# Patient Record
Sex: FEMALE | Race: BLACK OR AFRICAN AMERICAN | HISPANIC OR LATINO | Employment: FULL TIME | URBAN - METROPOLITAN AREA
[De-identification: names, ages, dates, MRNs, and addresses within clinical notes are randomized per-mention and may not be internally consistent; named-entity substitution may affect disease eponyms.]

---

## 2023-07-26 ENCOUNTER — LAB VISIT (OUTPATIENT)
Dept: LAB | Facility: HOSPITAL | Age: 38
End: 2023-07-26
Payer: MEDICAID

## 2023-07-26 ENCOUNTER — OFFICE VISIT (OUTPATIENT)
Dept: OBSTETRICS AND GYNECOLOGY | Facility: CLINIC | Age: 38
End: 2023-07-26
Payer: MEDICAID

## 2023-07-26 VITALS
WEIGHT: 224.63 LBS | HEIGHT: 64 IN | BODY MASS INDEX: 38.35 KG/M2 | SYSTOLIC BLOOD PRESSURE: 130 MMHG | DIASTOLIC BLOOD PRESSURE: 74 MMHG

## 2023-07-26 DIAGNOSIS — N91.2 AMENORRHEA: ICD-10-CM

## 2023-07-26 DIAGNOSIS — O26.842 UTERINE SIZE-DATE DISCREPANCY IN SECOND TRIMESTER: ICD-10-CM

## 2023-07-26 DIAGNOSIS — Z32.00 POSSIBLE PREGNANCY: ICD-10-CM

## 2023-07-26 DIAGNOSIS — Z32.00 POSSIBLE PREGNANCY: Primary | ICD-10-CM

## 2023-07-26 LAB
ABO + RH BLD: NORMAL
ANION GAP SERPL CALC-SCNC: 7 MMOL/L (ref 8–16)
B-HCG UR QL: POSITIVE
BASOPHILS # BLD AUTO: 0.02 K/UL (ref 0–0.2)
BASOPHILS NFR BLD: 0.4 % (ref 0–1.9)
BLD GP AB SCN CELLS X3 SERPL QL: NORMAL
BUN SERPL-MCNC: 8 MG/DL (ref 6–20)
CALCIUM SERPL-MCNC: 8.2 MG/DL (ref 8.7–10.5)
CHLORIDE SERPL-SCNC: 105 MMOL/L (ref 95–110)
CO2 SERPL-SCNC: 24 MMOL/L (ref 23–29)
CREAT SERPL-MCNC: 0.7 MG/DL (ref 0.5–1.4)
CTP QC/QA: YES
DIFFERENTIAL METHOD: ABNORMAL
EOSINOPHIL # BLD AUTO: 0.1 K/UL (ref 0–0.5)
EOSINOPHIL NFR BLD: 1 % (ref 0–8)
ERYTHROCYTE [DISTWIDTH] IN BLOOD BY AUTOMATED COUNT: 13.8 % (ref 11.5–14.5)
EST. GFR  (NO RACE VARIABLE): >60 ML/MIN/1.73 M^2
ESTIMATED AVG GLUCOSE: 103 MG/DL (ref 68–131)
GLUCOSE SERPL-MCNC: 87 MG/DL (ref 70–110)
HBA1C MFR BLD: 5.2 % (ref 4–5.6)
HBV SURFACE AG SERPL QL IA: NORMAL
HCT VFR BLD AUTO: 33.9 % (ref 37–48.5)
HCV AB SERPL QL IA: NORMAL
HGB BLD-MCNC: 10.7 G/DL (ref 12–16)
HIV 1+2 AB+HIV1 P24 AG SERPL QL IA: NORMAL
IMM GRANULOCYTES # BLD AUTO: 0.01 K/UL (ref 0–0.04)
IMM GRANULOCYTES NFR BLD AUTO: 0.2 % (ref 0–0.5)
LYMPHOCYTES # BLD AUTO: 1.2 K/UL (ref 1–4.8)
LYMPHOCYTES NFR BLD: 23.9 % (ref 18–48)
MCH RBC QN AUTO: 29.4 PG (ref 27–31)
MCHC RBC AUTO-ENTMCNC: 31.6 G/DL (ref 32–36)
MCV RBC AUTO: 93 FL (ref 82–98)
MONOCYTES # BLD AUTO: 0.5 K/UL (ref 0.3–1)
MONOCYTES NFR BLD: 9.6 % (ref 4–15)
NEUTROPHILS # BLD AUTO: 3.2 K/UL (ref 1.8–7.7)
NEUTROPHILS NFR BLD: 64.9 % (ref 38–73)
NRBC BLD-RTO: 0 /100 WBC
PLATELET # BLD AUTO: 412 K/UL (ref 150–450)
PMV BLD AUTO: 8.9 FL (ref 9.2–12.9)
POTASSIUM SERPL-SCNC: 3.8 MMOL/L (ref 3.5–5.1)
RBC # BLD AUTO: 3.64 M/UL (ref 4–5.4)
SODIUM SERPL-SCNC: 136 MMOL/L (ref 136–145)
SPECIMEN OUTDATE: NORMAL
WBC # BLD AUTO: 4.98 K/UL (ref 3.9–12.7)

## 2023-07-26 PROCEDURE — 1159F PR MEDICATION LIST DOCUMENTED IN MEDICAL RECORD: ICD-10-PCS | Mod: CPTII,,,

## 2023-07-26 PROCEDURE — 83020 HEMOGLOBIN ELECTROPHORESIS: CPT

## 2023-07-26 PROCEDURE — 3078F PR MOST RECENT DIASTOLIC BLOOD PRESSURE < 80 MM HG: ICD-10-PCS | Mod: CPTII,,,

## 2023-07-26 PROCEDURE — 86803 HEPATITIS C AB TEST: CPT

## 2023-07-26 PROCEDURE — 86592 SYPHILIS TEST NON-TREP QUAL: CPT

## 2023-07-26 PROCEDURE — 3008F BODY MASS INDEX DOCD: CPT | Mod: CPTII,,,

## 2023-07-26 PROCEDURE — 81025 URINE PREGNANCY TEST: CPT | Mod: PBBFAC

## 2023-07-26 PROCEDURE — 99999PBSHW POCT URINE PREGNANCY: Mod: PBBFAC,,,

## 2023-07-26 PROCEDURE — 83036 HEMOGLOBIN GLYCOSYLATED A1C: CPT

## 2023-07-26 PROCEDURE — 99999PBSHW PR PBB SHADOW TECHNICAL ONLY FILED TO HB: ICD-10-PCS | Mod: PBBFAC,,,

## 2023-07-26 PROCEDURE — 86762 RUBELLA ANTIBODY: CPT

## 2023-07-26 PROCEDURE — 3075F PR MOST RECENT SYSTOLIC BLOOD PRESS GE 130-139MM HG: ICD-10-PCS | Mod: CPTII,,,

## 2023-07-26 PROCEDURE — 1159F MED LIST DOCD IN RCRD: CPT | Mod: CPTII,,,

## 2023-07-26 PROCEDURE — 80048 BASIC METABOLIC PNL TOTAL CA: CPT

## 2023-07-26 PROCEDURE — 87340 HEPATITIS B SURFACE AG IA: CPT

## 2023-07-26 PROCEDURE — 85025 COMPLETE CBC W/AUTO DIFF WBC: CPT

## 2023-07-26 PROCEDURE — 87389 HIV-1 AG W/HIV-1&-2 AB AG IA: CPT

## 2023-07-26 PROCEDURE — 3075F SYST BP GE 130 - 139MM HG: CPT | Mod: CPTII,,,

## 2023-07-26 PROCEDURE — 99999PBSHW PR PBB SHADOW TECHNICAL ONLY FILED TO HB: Mod: PBBFAC,,,

## 2023-07-26 PROCEDURE — 86900 BLOOD TYPING SEROLOGIC ABO: CPT

## 2023-07-26 PROCEDURE — 87591 N.GONORRHOEAE DNA AMP PROB: CPT

## 2023-07-26 PROCEDURE — 87086 URINE CULTURE/COLONY COUNT: CPT

## 2023-07-26 PROCEDURE — 99203 OFFICE O/P NEW LOW 30 MIN: CPT | Mod: S$PBB,TH,,

## 2023-07-26 PROCEDURE — 81514 NFCT DS BV&VAGINITIS DNA ALG: CPT

## 2023-07-26 PROCEDURE — 99203 OFFICE O/P NEW LOW 30 MIN: CPT | Mod: PBBFAC,TH

## 2023-07-26 PROCEDURE — 3008F PR BODY MASS INDEX (BMI) DOCUMENTED: ICD-10-PCS | Mod: CPTII,,,

## 2023-07-26 PROCEDURE — 99999 PR PBB SHADOW E&M-NEW PATIENT-LVL III: CPT | Mod: PBBFAC,,,

## 2023-07-26 PROCEDURE — 36415 COLL VENOUS BLD VENIPUNCTURE: CPT

## 2023-07-26 PROCEDURE — 3078F DIAST BP <80 MM HG: CPT | Mod: CPTII,,,

## 2023-07-26 PROCEDURE — 88175 CYTOPATH C/V AUTO FLUID REDO: CPT

## 2023-07-26 PROCEDURE — 99203 PR OFFICE/OUTPT VISIT, NEW, LEVL III, 30-44 MIN: ICD-10-PCS | Mod: S$PBB,TH,,

## 2023-07-26 PROCEDURE — 99999 PR PBB SHADOW E&M-NEW PATIENT-LVL III: ICD-10-PCS | Mod: PBBFAC,,,

## 2023-07-26 RX ORDER — LISDEXAMFETAMINE DIMESYLATE 50 MG/1
50 CAPSULE ORAL EVERY MORNING
COMMUNITY
Start: 2023-06-24 | End: 2023-11-07

## 2023-07-26 RX ORDER — FLUOXETINE HYDROCHLORIDE 40 MG/1
40 CAPSULE ORAL EVERY MORNING
COMMUNITY
Start: 2023-07-03

## 2023-07-26 RX ORDER — BUPROPION HYDROCHLORIDE 150 MG/1
150 TABLET ORAL EVERY MORNING
COMMUNITY
Start: 2023-07-03

## 2023-07-26 NOTE — PROGRESS NOTES
Brianna Aguirre is a 37 y.o. , presents today for amenorrhea.  Pregnancy is unexpected, not sure if she wants to place baby UFA. 3 kids at home youngest is 13.    C/C: amenorrhea  She  has not seen any other provider for this pregnancy. Went to a pregnancy crisis center  and told she was 19 weeks.     HPI: Reports amenorrhea since Patient's last menstrual period was 03/15/2023 (exact date). Prior to LMP, menses were regular prior to this.  She is not currently on any contraception. + UPT on 23. Also reports nausea without vomiting. Has noticed breast tenderness. Denies vaginal bleeding since LMP.    SOCIAL HISTORY: Denies emotional/mental/physical/sexual violence or abuse. Feels safe at home.      PAP HISTORY: last pap unknown, results-normal  denies any history of abnormal pap smear or STDs.     Reports no long-term chronic medical conditions    Review of patient's allergies indicates:  No Known Allergies  History reviewed. No pertinent past medical history.  History reviewed. No pertinent surgical history.  History reviewed. No pertinent surgical history.  OB History    Para Term  AB Living   4 3 3     3   SAB IAB Ectopic Multiple Live Births                  # Outcome Date GA Lbr Ed/2nd Weight Sex Delivery Anes PTL Lv   4 Current            3 Term            2 Term            1 Term              OB History          4    Para   3    Term   3            AB        Living   3         SAB        IAB        Ectopic        Multiple        Live Births                   Social History     Socioeconomic History    Marital status: Single   Tobacco Use    Smoking status: Never    Smokeless tobacco: Never   Substance and Sexual Activity    Alcohol use: Never    Drug use: Never    Sexual activity: Yes     Partners: Male     History reviewed. No pertinent family history.  Social History     Substance and Sexual Activity   Sexual Activity Yes    Partners: Male         Mary SCHUMACHER  "LINNEA Frias     ROS:    Constitutional/Gen: Denies fevers, chills, malaise, or weight loss. Positive fatigue   Psych: Denies depression, anxiety  Eyes: Denies changes in vision or scotomata  Ears, nose, mouth, throat: Denies sinus tenderness, swelling, or dentition problems  CV/vasc: Denies heart palpitations or edema  Resp: Denies SOB or dyspnea  Breasts: Denies mass, nipple discharge, or trauma. Positive breast tenderness.  GI: Denies constipation, diarrhea, or vomiting. Negative nausea.  : Denies vaginal discharge, dysuria or pelvic pain. Positive urinary frequency  MS: Denies weakness, soreness, or changes in ROM    OBJECTIVE:  /74   Ht 5' 4" (1.626 m)   Wt 101.9 kg (224 lb 10.4 oz)   LMP 03/15/2023 (Exact Date)   BMI 38.56 kg/m²   Constitutional/Gen: NAD, appears stated age, Well groomed  Neck: supple, no masses or enlargement  Head: normocephalic  Skin: warm and dry w/o rash  Lung: normal resp effort, CTAB  Heart: normal HR, RRR   Breasts: bilaterally--no masses, tenderness, skin changes, or nipple discharge noted  Abdomen: soft, nontender, no masses, and bowel sounds normal, no enlargement  External genitalia: no lesions or discharge, normal hair distribution  Urethral meatus: normal size and location, no lesions or prolapse  Vagina: normal appearance, no lesions, no discharge, no evidence cystocele or rectocele.  Cervix: normal appearance, no discharge, no lesions, negative CMT  Uterus: nontender, mobile, approx 20 week size, contour, and position. 162 FHTs via Doppler  Adnexa: no masses or tenderness  Anus/Perineum: normal appearance, with no lesions or discharge. Internal exam deferred.  Extremities: FROM, with no edema or tenderness.  Neurologic: A&O x 4, non-focal, cranial nerves 2-12 grossly intact  Psych: affect appropriate and without signs of mood, thought or memory difficulty appreciated    UPT positive in office    ASSESSMENT:  37 y.o. female  with amenorrhea  Likely at 19w via " LMP   Body mass index is 38.56 kg/m².  There is no problem list on file for this patient.      PLAN:  1. Amenorrhea  -- + UPT in office, Patient's last menstrual period was 03/15/2023 (exact date). --> Estimated Date of Delivery: 12/20/2023  -- Dating US Scheduled  -- Routine serum and urine prenatal labs today    2. Physical exam today. Discussed ASCCP guidelines. . Pap completed today/     3. BMI - unable to calculate due to advanced gestation     -- Discussed IOM recommended weight gain of:   Weight category Pre pre BMI  Recommended TWG   Underweight Less than 18.5 28-40    Normal Weight 18.5-24.9  25-35    Overweight 25-29.9  15-25    Obese   30 and greater  11-20   -- Discussed criteria for delivery at [facility] r/t excessive pre-preg weight or excessive weight gain:   Pre-pregnancy BMI over 40 or excess pregnancy weight gain defined as:   Pre-preg BMI < 18.5; Excess weight gain = > 60 pound   Pre-preg BMI 18.5-24.9;  Excess weight gain = > 53 pounds   Pre-preg BMI 25-29.9;  Excess weight gain = > 38 pounds   Pre-preg BMI > 30;  Excess weight gain = > 30 pounds    4. Discussed nausea and vomiting in pregnancy  -- Education regarding lifestyle and dietary modifications  -- Reviewed use of B6/Unisom prn. Pt will notify us if no relief/worsening symptoms, will consider alternative therapies prn    5. Pregnancy education and couseling; handouts and booklet provided  -- Oriented to practice and anticipated prenatal course of care and how to contact us  -- Precautions/warning signs reviewed  -- Common complaints of pregnancy  -- Routine prenatal labs including HIV  -- Ultrasounds  -- Childbirth education/hospital/[facility] facilities  -- Nutrition, prepregnant BMI, and recommended weight gain  -- Toxoplasmosis precautions (Cats/Raw Meat)  -- Sexual activity and exercise  -- Environmental/Work hazards  -- Travel  -- Tobacco (Ask, Advise, Assess, Assist, and Arrange), as well as alcohol and drug use  -- Use of any medications  (Including supplements, Vitamins, Herbs, or OTC Drugs)      6. Reviewed warning signs, precautions, and how/when to contact us.     7. RTC x 1 weeks, call or present sooner prn.      Updated Medication List:  Current Outpatient Medications   Medication Sig Dispense Refill    buPROPion (WELLBUTRIN XL) 150 MG TB24 tablet Take 150 mg by mouth every morning.      FLUoxetine 40 MG capsule Take 40 mg by mouth every morning.      VYVANSE 50 mg capsule Take 50 mg by mouth every morning.       No current facility-administered medications for this visit.       Possible pregnancy  -     POCT Urine Pregnancy    Amenorrhea  -     Hepatitis C Antibody; Future; Expected date: 07/26/2023  -     HIV 1/2 Ag/Ab (4th Gen); Future; Expected date: 07/26/2023  -     RPR; Future; Expected date: 07/26/2023  -     Hepatitis B surface antigen; Future; Expected date: 07/26/2023  -     Type & Screen; Future; Expected date: 07/26/2023  -     Rubella antibody, IgG; Future; Expected date: 07/26/2023  -     Urine culture  -     CBC auto differential; Future; Expected date: 07/26/2023  -     Basic metabolic panel; Future; Expected date: 07/26/2023  -     C. trachomatis/N. gonorrhoeae by AMP DNA [facility]; Vagina  -     Liquid-Based Pap Smear, Screening  -     Vaginosis Screen by DNA Probe  -     Hemoglobin Electrophoresis,Hgb A2 [facility].; Future; Expected date: 07/26/2023  -     HEMOGLOBIN A1C; Future; Expected date: 07/26/2023    Uterine size-date discrepancy in second trimester  -     US OB/GYN Procedure ([facility]); Future

## 2023-07-27 LAB
BACTERIAL VAGINOSIS DNA: POSITIVE
C TRACH DNA SPEC QL NAA+PROBE: NOT DETECTED
CANDIDA GLABRATA DNA: NEGATIVE
CANDIDA KRUSEI DNA: NEGATIVE
CANDIDA RRNA VAG QL PROBE: NEGATIVE
HGB A2 MFR BLD HPLC: 2.6 % (ref 2.2–3.2)
HGB FRACT BLD ELPH-IMP: NORMAL
HGB FRACT BLD ELPH-IMP: NORMAL
N GONORRHOEA DNA SPEC QL NAA+PROBE: NOT DETECTED
RPR SER QL: NORMAL
RUBV IGG SER-ACNC: 34.2 IU/ML
RUBV IGG SER-IMP: REACTIVE
T VAGINALIS RRNA GENITAL QL PROBE: NEGATIVE

## 2023-07-28 DIAGNOSIS — O23.599 BACTERIAL VAGINOSIS IN PREGNANCY: Primary | ICD-10-CM

## 2023-07-28 DIAGNOSIS — B96.89 BACTERIAL VAGINOSIS IN PREGNANCY: Primary | ICD-10-CM

## 2023-07-28 LAB — BACTERIA UR CULT: NO GROWTH

## 2023-07-28 RX ORDER — METRONIDAZOLE 500 MG/1
500 TABLET ORAL 2 TIMES DAILY
Qty: 14 TABLET | Refills: 0 | Status: SHIPPED | OUTPATIENT
Start: 2023-07-28 | End: 2023-08-04

## 2023-07-31 ENCOUNTER — PATIENT MESSAGE (OUTPATIENT)
Dept: RESEARCH | Facility: HOSPITAL | Age: 38
End: 2023-07-31
Payer: MEDICAID

## 2023-07-31 LAB
FINAL PATHOLOGIC DIAGNOSIS: NORMAL
Lab: NORMAL

## 2023-08-07 ENCOUNTER — PROCEDURE VISIT (OUTPATIENT)
Dept: OBSTETRICS AND GYNECOLOGY | Facility: CLINIC | Age: 38
End: 2023-08-07
Payer: MEDICAID

## 2023-08-07 ENCOUNTER — LAB VISIT (OUTPATIENT)
Dept: LAB | Facility: HOSPITAL | Age: 38
End: 2023-08-07
Attending: MIDWIFE
Payer: MEDICAID

## 2023-08-07 ENCOUNTER — INITIAL PRENATAL (OUTPATIENT)
Dept: OBSTETRICS AND GYNECOLOGY | Facility: CLINIC | Age: 38
End: 2023-08-07
Payer: MEDICAID

## 2023-08-07 VITALS — WEIGHT: 226.19 LBS | BODY MASS INDEX: 38.83 KG/M2

## 2023-08-07 DIAGNOSIS — F90.0 ATTENTION DEFICIT HYPERACTIVITY DISORDER (ADHD), PREDOMINANTLY INATTENTIVE TYPE: ICD-10-CM

## 2023-08-07 DIAGNOSIS — D64.9 MILD ANEMIA: ICD-10-CM

## 2023-08-07 DIAGNOSIS — Z36.2 ENCOUNTER FOR FOLLOW-UP ULTRASOUND OF FETAL ANATOMY: ICD-10-CM

## 2023-08-07 DIAGNOSIS — F41.9 ANXIETY AND DEPRESSION: ICD-10-CM

## 2023-08-07 DIAGNOSIS — F32.A ANXIETY AND DEPRESSION: ICD-10-CM

## 2023-08-07 DIAGNOSIS — O09.522 MULTIGRAVIDA OF ADVANCED MATERNAL AGE IN SECOND TRIMESTER: ICD-10-CM

## 2023-08-07 DIAGNOSIS — O26.842 UTERINE SIZE-DATE DISCREPANCY IN SECOND TRIMESTER: ICD-10-CM

## 2023-08-07 DIAGNOSIS — B96.89 BACTERIAL VAGINOSIS IN PREGNANCY: ICD-10-CM

## 2023-08-07 DIAGNOSIS — O09.522 MULTIGRAVIDA OF ADVANCED MATERNAL AGE IN SECOND TRIMESTER: Primary | ICD-10-CM

## 2023-08-07 DIAGNOSIS — O23.599 BACTERIAL VAGINOSIS IN PREGNANCY: ICD-10-CM

## 2023-08-07 PROBLEM — O09.529 AMA (ADVANCED MATERNAL AGE) MULTIGRAVIDA 35+: Status: ACTIVE | Noted: 2023-08-07

## 2023-08-07 PROBLEM — F90.9 ADHD: Status: ACTIVE | Noted: 2023-08-07

## 2023-08-07 PROCEDURE — 76811 US OB/GYN PROCEDURE (VIEWPOINT): ICD-10-PCS | Mod: 26,S$PBB,, | Performed by: OBSTETRICS & GYNECOLOGY

## 2023-08-07 PROCEDURE — 76811 OB US DETAILED SNGL FETUS: CPT | Mod: PBBFAC | Performed by: OBSTETRICS & GYNECOLOGY

## 2023-08-07 PROCEDURE — 99214 PR OFFICE/OUTPT VISIT, EST, LEVL IV, 30-39 MIN: ICD-10-PCS | Mod: S$PBB,TH,, | Performed by: MIDWIFE

## 2023-08-07 PROCEDURE — 99212 OFFICE O/P EST SF 10 MIN: CPT | Mod: PBBFAC,TH | Performed by: MIDWIFE

## 2023-08-07 PROCEDURE — 99214 OFFICE O/P EST MOD 30 MIN: CPT | Mod: S$PBB,TH,, | Performed by: MIDWIFE

## 2023-08-07 PROCEDURE — 99999 PR PBB SHADOW E&M-EST. PATIENT-LVL II: ICD-10-PCS | Mod: PBBFAC,,, | Performed by: MIDWIFE

## 2023-08-07 PROCEDURE — 99999 PR PBB SHADOW E&M-EST. PATIENT-LVL II: CPT | Mod: PBBFAC,,, | Performed by: MIDWIFE

## 2023-08-07 PROCEDURE — 36415 COLL VENOUS BLD VENIPUNCTURE: CPT | Performed by: MIDWIFE

## 2023-08-07 RX ORDER — FERROUS SULFATE 325(65) MG
325 TABLET, DELAYED RELEASE (ENTERIC COATED) ORAL DAILY
Qty: 60 TABLET | Refills: 5 | Status: SHIPPED | OUTPATIENT
Start: 2023-08-07 | End: 2023-09-28 | Stop reason: DRUGHIGH

## 2023-08-07 RX ORDER — NAPROXEN SODIUM 220 MG/1
81 TABLET, FILM COATED ORAL DAILY
Qty: 90 TABLET | Refills: 2 | Status: ON HOLD | OUTPATIENT
Start: 2023-08-07 | End: 2023-11-27 | Stop reason: HOSPADM

## 2023-08-08 NOTE — PROGRESS NOTES
Multigravida of advanced maternal age in second trimester  -     Prenatal Miscellaneous Test, Blood; Future; Expected date: 08/07/2023    Patient here for Initial OB visit and dating scan  U/S: single viable IUP. DMITRIY based on CRL: 12/16/2023. Posterior placenta, 3VC. Some views sub-optimal.   Denies pain or vaginal bleeding. +fetal movement. Is more accepting of pregnancy.  Reviewed Risk Assessment note and New OB labs.  Discussed genetic screening in pregnancy.[id number] today.  A to Z book/L&D card given. Encouraged to reference as needed.   Bleeding and pain precautions reviewed  RTC in 4 weeks with f/u scan, sooner if needed    Mild anemia  -     ferrous sulfate 325 (65 FE) MG EC tablet; Take 1 tablet (325 mg total) by mouth once daily.  Dispense: 60 tablet; Refill: 5    Bacterial vaginosis in pregnancy    States completed Flagyl Rx    Encounter for follow-up ultrasound of fetal anatomy  -     US OB/GYN Procedure ([facility])-Future; Future    BMI 38.0-38.9,adult  -     aspirin 81 MG Chew; Take 1 tablet (81 mg total) by mouth once daily.  Dispense: 90 tablet; Refill: 2    Discussed benefits of daily baby aspirin and Rx sent     Attention deficit hyperactivity disorder (ADHD), predominantly inattentive type         Takes Vyvanse daily. Discussed significant risk of withdrawal symptoms in infant and increased risk for NICU admission. Highly advised discontinuing. Pt states she will wean herself off. She has been in contact with prescriber regarding use in pregnancy as well.     Anxiety and depression    Stable on Prozac and Wellbutrin

## 2023-08-21 ENCOUNTER — PATIENT MESSAGE (OUTPATIENT)
Dept: OBSTETRICS AND GYNECOLOGY | Facility: CLINIC | Age: 38
End: 2023-08-21
Payer: MEDICAID

## 2023-08-26 ENCOUNTER — PATIENT MESSAGE (OUTPATIENT)
Dept: OTHER | Facility: OTHER | Age: 38
End: 2023-08-26
Payer: MEDICAID

## 2023-09-06 ENCOUNTER — ROUTINE PRENATAL (OUTPATIENT)
Dept: OBSTETRICS AND GYNECOLOGY | Facility: CLINIC | Age: 38
End: 2023-09-06
Payer: MEDICAID

## 2023-09-06 ENCOUNTER — PROCEDURE VISIT (OUTPATIENT)
Dept: OBSTETRICS AND GYNECOLOGY | Facility: CLINIC | Age: 38
End: 2023-09-06
Payer: MEDICAID

## 2023-09-06 VITALS
SYSTOLIC BLOOD PRESSURE: 118 MMHG | WEIGHT: 230.19 LBS | BODY MASS INDEX: 39.51 KG/M2 | DIASTOLIC BLOOD PRESSURE: 72 MMHG

## 2023-09-06 DIAGNOSIS — Z36.4 ULTRASOUND FOR ANTENATAL SCREENING FOR FETAL GROWTH RESTRICTION: Primary | ICD-10-CM

## 2023-09-06 DIAGNOSIS — O09.522 MULTIGRAVIDA OF ADVANCED MATERNAL AGE IN SECOND TRIMESTER: ICD-10-CM

## 2023-09-06 DIAGNOSIS — O99.210 OBESITY IN PREGNANCY: ICD-10-CM

## 2023-09-06 DIAGNOSIS — Z36.2 ENCOUNTER FOR FOLLOW-UP ULTRASOUND OF FETAL ANATOMY: ICD-10-CM

## 2023-09-06 DIAGNOSIS — Z3A.25 25 WEEKS GESTATION OF PREGNANCY: Primary | ICD-10-CM

## 2023-09-06 DIAGNOSIS — Z3A.24 24 WEEKS GESTATION OF PREGNANCY: ICD-10-CM

## 2023-09-06 DIAGNOSIS — Z34.82 ENCOUNTER FOR SUPERVISION OF OTHER NORMAL PREGNANCY IN SECOND TRIMESTER: ICD-10-CM

## 2023-09-06 PROCEDURE — 76816 OB US FOLLOW-UP PER FETUS: CPT | Mod: 26,S$PBB,, | Performed by: OBSTETRICS & GYNECOLOGY

## 2023-09-06 PROCEDURE — 99213 OFFICE O/P EST LOW 20 MIN: CPT | Mod: TH,S$PBB,,

## 2023-09-06 PROCEDURE — 99212 OFFICE O/P EST SF 10 MIN: CPT | Mod: PBBFAC,TH

## 2023-09-06 PROCEDURE — 99999 PR PBB SHADOW E&M-EST. PATIENT-LVL II: CPT | Mod: PBBFAC,,,

## 2023-09-06 PROCEDURE — 99213 PR OFFICE/OUTPT VISIT, EST, LEVL III, 20-29 MIN: ICD-10-PCS | Mod: TH,S$PBB,,

## 2023-09-06 PROCEDURE — 99999 PR PBB SHADOW E&M-EST. PATIENT-LVL II: ICD-10-PCS | Mod: PBBFAC,,,

## 2023-09-06 PROCEDURE — 76816 PR  US,PREGNANT UTERUS,F/U,TRANSABD APP: ICD-10-PCS | Mod: 26,S$PBB,, | Performed by: OBSTETRICS & GYNECOLOGY

## 2023-09-06 PROCEDURE — 76816 OB US FOLLOW-UP PER FETUS: CPT | Mod: PBBFAC | Performed by: OBSTETRICS & GYNECOLOGY

## 2023-09-09 ENCOUNTER — PATIENT MESSAGE (OUTPATIENT)
Dept: OTHER | Facility: OTHER | Age: 38
End: 2023-09-09
Payer: MEDICAID

## 2023-09-12 NOTE — PROGRESS NOTES
37 y.o. female  at 25w4d   Reports + FM, denies VB, LOF, or cramping  Doing well without concerns   US reviewed: , breech, posterior placenta, 3VC, ESTELLE wNL, MVP 6.2cm, EFW 787g (1lb 12oz), 38%ile, anatomy appears complete, MFM to review  TW lbs   Breast pump Rx: bottle feeding    Reviewed upcoming 28wk labs, (B POS) and orders placed, tdap handout provided and explained    25 weeks gestation of pregnancy  - routine PNC   Encounter for supervision of other normal pregnancy in second trimester  -     OB Glucose Screen; Future; Expected date: 2023  -     CBC auto differential; Future; Expected date: 2023  -     HIV 1/2 Ag/Ab (4th Gen); Future; Expected date: 2023  -     RPR; Future; Expected date: 2023    Multigravida of advanced maternal age in second trimester        Reviewed warning signs, normal FM,  labor precautions and how/when to call. Patient states understanding.  RTC x 3 wks, call or present sooner prn.

## 2023-09-23 ENCOUNTER — PATIENT MESSAGE (OUTPATIENT)
Dept: OTHER | Facility: OTHER | Age: 38
End: 2023-09-23
Payer: MEDICAID

## 2023-09-27 ENCOUNTER — PATIENT MESSAGE (OUTPATIENT)
Dept: OBSTETRICS AND GYNECOLOGY | Facility: CLINIC | Age: 38
End: 2023-09-27

## 2023-09-27 ENCOUNTER — LAB VISIT (OUTPATIENT)
Dept: LAB | Facility: HOSPITAL | Age: 38
End: 2023-09-27
Payer: MEDICAID

## 2023-09-27 ENCOUNTER — ROUTINE PRENATAL (OUTPATIENT)
Dept: OBSTETRICS AND GYNECOLOGY | Facility: CLINIC | Age: 38
End: 2023-09-27
Payer: MEDICAID

## 2023-09-27 VITALS
WEIGHT: 234.56 LBS | BODY MASS INDEX: 40.26 KG/M2 | DIASTOLIC BLOOD PRESSURE: 70 MMHG | SYSTOLIC BLOOD PRESSURE: 118 MMHG

## 2023-09-27 DIAGNOSIS — Z3A.28 28 WEEKS GESTATION OF PREGNANCY: Primary | ICD-10-CM

## 2023-09-27 DIAGNOSIS — Z34.82 ENCOUNTER FOR SUPERVISION OF OTHER NORMAL PREGNANCY IN SECOND TRIMESTER: ICD-10-CM

## 2023-09-27 DIAGNOSIS — O09.523 MULTIGRAVIDA OF ADVANCED MATERNAL AGE IN THIRD TRIMESTER: ICD-10-CM

## 2023-09-27 LAB
BASOPHILS # BLD AUTO: 0.02 K/UL (ref 0–0.2)
BASOPHILS NFR BLD: 0.4 % (ref 0–1.9)
DIFFERENTIAL METHOD: ABNORMAL
EOSINOPHIL # BLD AUTO: 0 K/UL (ref 0–0.5)
EOSINOPHIL NFR BLD: 0.6 % (ref 0–8)
ERYTHROCYTE [DISTWIDTH] IN BLOOD BY AUTOMATED COUNT: 13.6 % (ref 11.5–14.5)
GLUCOSE SERPL-MCNC: 141 MG/DL (ref 70–140)
HCT VFR BLD AUTO: 28.6 % (ref 37–48.5)
HGB BLD-MCNC: 9.6 G/DL (ref 12–16)
HIV 1+2 AB+HIV1 P24 AG SERPL QL IA: NORMAL
IMM GRANULOCYTES # BLD AUTO: 0.01 K/UL (ref 0–0.04)
IMM GRANULOCYTES NFR BLD AUTO: 0.2 % (ref 0–0.5)
LYMPHOCYTES # BLD AUTO: 1.3 K/UL (ref 1–4.8)
LYMPHOCYTES NFR BLD: 28.1 % (ref 18–48)
MCH RBC QN AUTO: 29.2 PG (ref 27–31)
MCHC RBC AUTO-ENTMCNC: 33.6 G/DL (ref 32–36)
MCV RBC AUTO: 87 FL (ref 82–98)
MONOCYTES # BLD AUTO: 0.4 K/UL (ref 0.3–1)
MONOCYTES NFR BLD: 9 % (ref 4–15)
NEUTROPHILS # BLD AUTO: 2.9 K/UL (ref 1.8–7.7)
NEUTROPHILS NFR BLD: 61.7 % (ref 38–73)
NRBC BLD-RTO: 0 /100 WBC
PLATELET # BLD AUTO: 416 K/UL (ref 150–450)
PMV BLD AUTO: 8.8 FL (ref 9.2–12.9)
RBC # BLD AUTO: 3.29 M/UL (ref 4–5.4)
WBC # BLD AUTO: 4.77 K/UL (ref 3.9–12.7)

## 2023-09-27 PROCEDURE — 99214 PR OFFICE/OUTPT VISIT, EST, LEVL IV, 30-39 MIN: ICD-10-PCS | Mod: TH,S$PBB,,

## 2023-09-27 PROCEDURE — 99999 PR PBB SHADOW E&M-EST. PATIENT-LVL II: CPT | Mod: PBBFAC,,,

## 2023-09-27 PROCEDURE — 99214 OFFICE O/P EST MOD 30 MIN: CPT | Mod: TH,S$PBB,,

## 2023-09-27 PROCEDURE — 85025 COMPLETE CBC W/AUTO DIFF WBC: CPT

## 2023-09-27 PROCEDURE — 86592 SYPHILIS TEST NON-TREP QUAL: CPT

## 2023-09-27 PROCEDURE — 36415 COLL VENOUS BLD VENIPUNCTURE: CPT

## 2023-09-27 PROCEDURE — 82950 GLUCOSE TEST: CPT

## 2023-09-27 PROCEDURE — 87389 HIV-1 AG W/HIV-1&-2 AB AG IA: CPT

## 2023-09-27 PROCEDURE — 99212 OFFICE O/P EST SF 10 MIN: CPT | Mod: PBBFAC,TH

## 2023-09-27 PROCEDURE — 99999 PR PBB SHADOW E&M-EST. PATIENT-LVL II: ICD-10-PCS | Mod: PBBFAC,,,

## 2023-09-28 ENCOUNTER — PATIENT MESSAGE (OUTPATIENT)
Dept: OBSTETRICS AND GYNECOLOGY | Facility: CLINIC | Age: 38
End: 2023-09-28
Payer: MEDICAID

## 2023-09-28 DIAGNOSIS — O99.810 ABNORMAL O'SULLIVAN GLUCOSE CHALLENGE TEST, ANTEPARTUM: ICD-10-CM

## 2023-09-28 DIAGNOSIS — O99.013 ANEMIA AFFECTING PREGNANCY IN THIRD TRIMESTER: ICD-10-CM

## 2023-09-28 LAB — RPR SER QL: NORMAL

## 2023-09-28 RX ORDER — FERROUS SULFATE 325(65) MG
325 TABLET ORAL 2 TIMES DAILY
Qty: 60 TABLET | Refills: 3 | Status: SHIPPED | OUTPATIENT
Start: 2023-09-28

## 2023-09-28 NOTE — PROGRESS NOTES
37 y.o. female  at 28w4d   Reports + FM, denies VB, LOF or CTX  Doing well without concerns   TW lbs   28wk labs today (B POS)  Tdap desires [place]  LD card given and reviewed    28 weeks gestation of pregnancy  - routine PNC   Multigravida of advanced maternal age in third trimester        Reviewed warning signs, normal FKCs,  labor precautions and how/when to call. Patient states understanding  RTC x 2 wks, call or present sooner prn.

## 2023-09-29 ENCOUNTER — ON-DEMAND VIRTUAL (OUTPATIENT)
Dept: URGENT CARE | Facility: CLINIC | Age: 38
End: 2023-09-29
Payer: MEDICAID

## 2023-09-29 DIAGNOSIS — N76.0 ACUTE VAGINITIS: Primary | ICD-10-CM

## 2023-09-29 PROCEDURE — 99212 PR OFFICE/OUTPT VISIT, EST, LEVL II, 10-19 MIN: ICD-10-PCS | Mod: 95,,, | Performed by: NURSE PRACTITIONER

## 2023-09-29 PROCEDURE — 99212 OFFICE O/P EST SF 10 MIN: CPT | Mod: 95,,, | Performed by: NURSE PRACTITIONER

## 2023-09-29 RX ORDER — CLOTRIMAZOLE 1 %
CREAM (GRAM) TOPICAL 2 TIMES DAILY
Qty: 30 G | Refills: 0 | Status: SHIPPED | OUTPATIENT
Start: 2023-09-29 | End: 2023-11-07

## 2023-09-29 NOTE — PROGRESS NOTES
Subjective:      Patient ID: Brianna Aguirre is a 37 y.o. female.    Vitals:  vitals were not taken for this visit.     Chief Complaint: Vaginal Itching      Visit Type: TELE AUDIOVISUAL    Present with the patient at the time of consultation: TELEMED PRESENT WITH PATIENT: None    Past Medical History:   Diagnosis Date    Mental disorder     ADHD    Mild anemia 8/7/2023     History reviewed. No pertinent surgical history.  Review of patient's allergies indicates:  No Known Allergies  Current Outpatient Medications on File Prior to Visit   Medication Sig Dispense Refill    aspirin 81 MG Chew Take 1 tablet (81 mg total) by mouth once daily. 90 tablet 2    buPROPion (WELLBUTRIN XL) 150 MG TB24 tablet Take 150 mg by mouth every morning.      ferrous sulfate (FEOSOL) 325 mg (65 mg iron) Tab tablet Take 1 tablet (325 mg total) by mouth 2 (two) times daily. 60 tablet 3    FLUoxetine 40 MG capsule Take 40 mg by mouth every morning.      VYVANSE 50 mg capsule Take 50 mg by mouth every morning.       No current facility-administered medications on file prior to visit.     History reviewed. No pertinent family history.        Ohs Peq Odvv Intake    9/29/2023  7:14 AM CDT - Filed by Patient   Describe your reason for todays visit [facility] [place]   What is your current physical address in the event of a medical emergency? [address] [place] [place] [address]   Are you able to take your vital signs? No   Please attach any relevant images or files          Patient is visiting from [place], [place].  C/o vaginal itching, no report of discharge, odor or bleeding.      Vaginal Itching  The patient's pertinent negatives include no pelvic pain or vaginal discharge. Pertinent negatives include no abdominal pain, chills, constipation, diarrhea, dysuria, fever, flank pain, hematuria, nausea, rash, urgency or vomiting.       Constitution: Negative for chills, sweating, fatigue and fever.   Neck: Negative for painful lymph nodes.   Cardiovascular:   Negative for chest pain, palpitations and sob on exertion.   Respiratory:  Negative for chest tightness, cough and shortness of breath.    Gastrointestinal:  Negative for abdominal pain, nausea, vomiting, constipation, diarrhea, bright red blood in stool, dark colored stools and rectal bleeding.   Genitourinary:  Negative for dysuria, urgency, flank pain, hematuria, vaginal pain, vaginal discharge, vaginal bleeding, vaginal odor, genital sore and pelvic pain.   Musculoskeletal:  Negative for muscle ache.   Skin:  Negative for color change, pale and rash.   Hematologic/Lymphatic: Negative for swollen lymph nodes.        Objective:   The physical exam was conducted virtually.  Physical Exam   Constitutional: She is oriented to person, place, and time. No distress.   HENT:   Head: Normocephalic and atraumatic.   Mouth/Throat: Oropharynx is clear and moist and mucous membranes are normal.   Eyes: Conjunctivae are normal. No scleral icterus.   Pulmonary/Chest: Effort normal. No respiratory distress.   Musculoskeletal: Normal range of motion.         General: Normal range of motion.   Neurological: She is alert and oriented to person, place, and time.   Skin: Skin is not diaphoretic.   Psychiatric: Her behavior is normal. Judgment and thought content normal.   Vitals reviewed.      Assessment:     1. Acute vaginitis        Plan:       Acute vaginitis                See additional patient Instructions provided

## 2023-10-07 ENCOUNTER — PATIENT MESSAGE (OUTPATIENT)
Dept: OTHER | Facility: OTHER | Age: 38
End: 2023-10-07
Payer: MEDICAID

## 2023-10-11 ENCOUNTER — ROUTINE PRENATAL (OUTPATIENT)
Dept: OBSTETRICS AND GYNECOLOGY | Facility: CLINIC | Age: 38
End: 2023-10-11
Payer: MEDICAID

## 2023-10-11 VITALS
BODY MASS INDEX: 40.83 KG/M2 | DIASTOLIC BLOOD PRESSURE: 72 MMHG | SYSTOLIC BLOOD PRESSURE: 122 MMHG | WEIGHT: 237.88 LBS

## 2023-10-11 DIAGNOSIS — O09.523 MULTIGRAVIDA OF ADVANCED MATERNAL AGE IN THIRD TRIMESTER: ICD-10-CM

## 2023-10-11 DIAGNOSIS — O99.013 ANEMIA AFFECTING PREGNANCY IN THIRD TRIMESTER: ICD-10-CM

## 2023-10-11 DIAGNOSIS — Z3A.30 30 WEEKS GESTATION OF PREGNANCY: Primary | ICD-10-CM

## 2023-10-11 DIAGNOSIS — O99.810 ABNORMAL O'SULLIVAN GLUCOSE CHALLENGE TEST, ANTEPARTUM: ICD-10-CM

## 2023-10-11 PROBLEM — O23.599 BACTERIAL VAGINOSIS IN PREGNANCY: Status: RESOLVED | Noted: 2023-07-28 | Resolved: 2023-10-11

## 2023-10-11 PROBLEM — B96.89 BACTERIAL VAGINOSIS IN PREGNANCY: Status: RESOLVED | Noted: 2023-07-28 | Resolved: 2023-10-11

## 2023-10-11 PROCEDURE — 99214 PR OFFICE/OUTPT VISIT, EST, LEVL IV, 30-39 MIN: ICD-10-PCS | Mod: TH,S$PBB,,

## 2023-10-11 PROCEDURE — 99212 OFFICE O/P EST SF 10 MIN: CPT | Mod: PBBFAC,TH

## 2023-10-11 PROCEDURE — 99999 PR PBB SHADOW E&M-EST. PATIENT-LVL II: CPT | Mod: PBBFAC,,,

## 2023-10-11 PROCEDURE — 99214 OFFICE O/P EST MOD 30 MIN: CPT | Mod: TH,S$PBB,,

## 2023-10-11 PROCEDURE — 99999 PR PBB SHADOW E&M-EST. PATIENT-LVL II: ICD-10-PCS | Mod: PBBFAC,,,

## 2023-10-11 NOTE — PROGRESS NOTES
38 y.o. female  at 30w4d   Reports + FM, denies VB, LOF or CTX  Doing well without concerns. Discussed with patient today [facility] policy. NO elective IOL before 39w.  Discussed risks and benefits of IOL. Discussed differences between elective and medical IOL. Patient verbalized understanding    TW lbs   Reviewed 28wk lab results (B POS) H/H: 9.6/28.6, RPR negative, HIV negative   Glucose screen Abnormal - 3 hour scheduled. Going Friday  Tdap declined  Anemia: Iron BID    30 weeks gestation of pregnancy  - routine PNC   Multigravida of advanced maternal age in third trimester  -     US OB/GYN Procedure ([facility])-Future; Future    Anemia affecting pregnancy in third trimester  - iron BID  Abnormal O'Turner glucose challenge test, antepartum  - failed 1 hour. Ate candy during test.  - 3 hour scheduled for friday      Reviewed warning signs, normal FKCs,  labor precautions and how/when to call. Patient states understanding  RTC x 2 wks, call or present sooner prn

## 2023-10-16 ENCOUNTER — TELEPHONE (OUTPATIENT)
Dept: OBSTETRICS AND GYNECOLOGY | Facility: CLINIC | Age: 38
End: 2023-10-16
Payer: MEDICAID

## 2023-10-16 NOTE — TELEPHONE ENCOUNTER
----- Message from Sonja Moreno CNM sent at 10/16/2023  3:44 PM CDT -----  Regarding: 3 hour GTT  Please contact the patient to reschedule 3 hour GTT. It needs to be done BEFORE her next visit.

## 2023-10-16 NOTE — TELEPHONE ENCOUNTER
Left message for patient to return call to [phone].    Patient's next appointment 10/25.  Needs 3 hour gtt BEFORE that appointment.

## 2023-10-21 ENCOUNTER — PATIENT MESSAGE (OUTPATIENT)
Dept: OTHER | Facility: OTHER | Age: 38
End: 2023-10-21
Payer: MEDICAID

## 2023-10-25 ENCOUNTER — PROCEDURE VISIT (OUTPATIENT)
Dept: OBSTETRICS AND GYNECOLOGY | Facility: CLINIC | Age: 38
End: 2023-10-25
Payer: MEDICAID

## 2023-10-25 ENCOUNTER — ROUTINE PRENATAL (OUTPATIENT)
Dept: OBSTETRICS AND GYNECOLOGY | Facility: CLINIC | Age: 38
End: 2023-10-25
Payer: MEDICAID

## 2023-10-25 ENCOUNTER — TELEPHONE (OUTPATIENT)
Dept: OBSTETRICS AND GYNECOLOGY | Facility: CLINIC | Age: 38
End: 2023-10-25
Payer: MEDICAID

## 2023-10-25 DIAGNOSIS — Z3A.32 32 WEEKS GESTATION OF PREGNANCY: Primary | ICD-10-CM

## 2023-10-25 DIAGNOSIS — O09.523 MULTIGRAVIDA OF ADVANCED MATERNAL AGE IN THIRD TRIMESTER: ICD-10-CM

## 2023-10-25 DIAGNOSIS — Z28.21 REFUSES TETANUS, DIPHTHERIA, AND ACELLULAR PERTUSSIS (TDAP) VACCINATION: ICD-10-CM

## 2023-10-25 DIAGNOSIS — O99.013 ANEMIA AFFECTING PREGNANCY IN THIRD TRIMESTER: ICD-10-CM

## 2023-10-25 DIAGNOSIS — Z36.2 ENCOUNTER FOR FOLLOW-UP ULTRASOUND OF FETAL ANATOMY: ICD-10-CM

## 2023-10-25 DIAGNOSIS — O99.810 ABNORMAL O'SULLIVAN GLUCOSE CHALLENGE TEST, ANTEPARTUM: ICD-10-CM

## 2023-10-25 DIAGNOSIS — Z28.21 INFLUENZA VACCINE REFUSED: ICD-10-CM

## 2023-10-25 PROCEDURE — 99214 PR OFFICE/OUTPT VISIT, EST, LEVL IV, 30-39 MIN: ICD-10-PCS | Mod: TH,S$PBB,, | Performed by: MIDWIFE

## 2023-10-25 PROCEDURE — 99999 PR PBB SHADOW E&M-EST. PATIENT-LVL III: CPT | Mod: PBBFAC,,, | Performed by: MIDWIFE

## 2023-10-25 PROCEDURE — 99214 OFFICE O/P EST MOD 30 MIN: CPT | Mod: TH,S$PBB,, | Performed by: MIDWIFE

## 2023-10-25 PROCEDURE — 99213 OFFICE O/P EST LOW 20 MIN: CPT | Mod: PBBFAC,TH | Performed by: MIDWIFE

## 2023-10-25 PROCEDURE — 99999 PR PBB SHADOW E&M-EST. PATIENT-LVL III: ICD-10-PCS | Mod: PBBFAC,,, | Performed by: MIDWIFE

## 2023-10-25 PROCEDURE — 76816 OB US FOLLOW-UP PER FETUS: CPT | Mod: PBBFAC | Performed by: OBSTETRICS & GYNECOLOGY

## 2023-10-25 PROCEDURE — 76816 US OB/GYN PROCEDURE (VIEWPOINT): ICD-10-PCS | Mod: 26,S$PBB,, | Performed by: OBSTETRICS & GYNECOLOGY

## 2023-10-30 ENCOUNTER — PROCEDURE VISIT (OUTPATIENT)
Dept: OBSTETRICS AND GYNECOLOGY | Facility: CLINIC | Age: 38
End: 2023-10-30
Payer: MEDICAID

## 2023-10-30 ENCOUNTER — TELEPHONE (OUTPATIENT)
Dept: OBSTETRICS AND GYNECOLOGY | Facility: CLINIC | Age: 38
End: 2023-10-30
Payer: MEDICAID

## 2023-10-30 VITALS
DIASTOLIC BLOOD PRESSURE: 61 MMHG | SYSTOLIC BLOOD PRESSURE: 120 MMHG | WEIGHT: 242.06 LBS | HEIGHT: 64 IN | BODY MASS INDEX: 41.33 KG/M2

## 2023-10-30 VITALS — DIASTOLIC BLOOD PRESSURE: 70 MMHG | SYSTOLIC BLOOD PRESSURE: 118 MMHG | WEIGHT: 239.44 LBS | BODY MASS INDEX: 41.1 KG/M2

## 2023-10-30 DIAGNOSIS — Z36.2 ENCOUNTER FOR FOLLOW-UP ULTRASOUND OF FETAL ANATOMY: Primary | ICD-10-CM

## 2023-10-30 DIAGNOSIS — O36.5990 FETAL GROWTH RESTRICTION ANTEPARTUM: ICD-10-CM

## 2023-10-30 DIAGNOSIS — O09.523 MULTIGRAVIDA OF ADVANCED MATERNAL AGE IN THIRD TRIMESTER: ICD-10-CM

## 2023-10-30 DIAGNOSIS — Z36.2 ENCOUNTER FOR FOLLOW-UP ULTRASOUND OF FETAL ANATOMY: ICD-10-CM

## 2023-10-30 DIAGNOSIS — O09.523 MULTIGRAVIDA OF ADVANCED MATERNAL AGE IN THIRD TRIMESTER: Primary | ICD-10-CM

## 2023-10-30 PROCEDURE — 76816 OB US FOLLOW-UP PER FETUS: CPT | Mod: PBBFAC,PO | Performed by: OBSTETRICS & GYNECOLOGY

## 2023-10-30 PROCEDURE — 99214 OFFICE O/P EST MOD 30 MIN: CPT | Mod: S$PBB,TH,, | Performed by: OBSTETRICS & GYNECOLOGY

## 2023-10-30 PROCEDURE — 99214 PR OFFICE/OUTPT VISIT, EST, LEVL IV, 30-39 MIN: ICD-10-PCS | Mod: S$PBB,TH,, | Performed by: OBSTETRICS & GYNECOLOGY

## 2023-10-30 PROCEDURE — 76819 FETAL BIOPHYS PROFIL W/O NST: CPT | Mod: PBBFAC,PO | Performed by: OBSTETRICS & GYNECOLOGY

## 2023-10-30 PROCEDURE — 76820 UMBILICAL ARTERY ECHO: CPT | Mod: 26,S$PBB,, | Performed by: OBSTETRICS & GYNECOLOGY

## 2023-10-30 PROCEDURE — 76820 PR US, OB DOPPLER, FETAL UMBILICAL ARTERY ECHO: ICD-10-PCS | Mod: 26,S$PBB,, | Performed by: OBSTETRICS & GYNECOLOGY

## 2023-10-30 PROCEDURE — 76820 UMBILICAL ARTERY ECHO: CPT | Mod: PBBFAC,PO | Performed by: OBSTETRICS & GYNECOLOGY

## 2023-10-30 PROCEDURE — 76819 FETAL BIOPHYS PROFIL W/O NST: CPT | Mod: 26,S$PBB,, | Performed by: OBSTETRICS & GYNECOLOGY

## 2023-10-30 PROCEDURE — 76816 OB US FOLLOW-UP PER FETUS: CPT | Mod: 26,S$PBB,, | Performed by: OBSTETRICS & GYNECOLOGY

## 2023-10-30 PROCEDURE — 76819 PR US, OB, FETAL BIOPHYSICAL, W/O NST: ICD-10-PCS | Mod: 26,S$PBB,, | Performed by: OBSTETRICS & GYNECOLOGY

## 2023-10-30 PROCEDURE — 76816 PR  US,PREGNANT UTERUS,F/U,TRANSABD APP: ICD-10-PCS | Mod: 26,S$PBB,, | Performed by: OBSTETRICS & GYNECOLOGY

## 2023-10-30 NOTE — TELEPHONE ENCOUNTER
Twice weekly BPPs w/ once weekly UA Dopplers initiated. Pt prefers [facility], early mornings or late afternoon if possible. Ok for next BPP to be on Friday per Dr. Tate, as pt prefers Friday appts due to work.

## 2023-10-30 NOTE — ASSESSMENT & PLAN NOTE
The patients fetus has been diagnosed with FGR based on EFW < 10th percentile/AC < 10th percentile. Potential etiologies of FGR include but are not limited to normal variation of stature, placental insufficiency, chromosomal abnormalities, genetic disorders, infections, medical conditions, teratogen exposure and other etiologies. Pregnancies complicated by FGR are at increased risk of stillbirth. We discussed delivery timing and recommendations for antepartum testing. FGR, by itself, is not an indication for  delivery, although there is an increased risk of needing a  due to heart rate abnormalities. Mode of delivery will be dictated by overall clinical status and doppler abnormalities (if any).     FGR is identified on today's study. The EFW plots at the 11%, and the AC plots at the 9%.   The EFW is 1830 g.  The fetal anatomic survey was completed today, and no fetal structural abnormalities were identified of the structures imaged today.    Placental location is posterior, fundal.   AFV is normal, and the BPP score is reassuring at 8/8.   Umbilical artery Doppler S/D ratios are normal.     Recommendations:  · Start twice weekly  fetal surveillance with NST and BPP until delivery.   · Start weekly UA dopplers.   · Repeat fetal growth ultrasound in 3 weeks (does not need to be with [facility])  · Patient counseled re: fetal movement monitoring and decreased fetal movement  · Monitor closely for any signs of evolving preeclampsia.  · If  testing is non-reassuring, delivery may be warranted regardless of GA.  · For all pregnancies with FGR, the placenta should be sent to pathology for examination.  · Mode of delivery will be dictated by overall clinical status and doppler abnormalities (if any).     General delivery timing (EFW as opposed to AC percentile should be used to determine delivery timing):  Exceptions to these recommendations may occur (e.g. gestational age <26 weeks). However,  in general, delivery should be considered when:    FGR (EFW 3rd-9th or EFW > 10th with AC < 10th percentile)   Normal testing, No co-morbid conditions: 380/7- 390/7   UA Doppler S/D ratio > 95th percentile: 370/7- 376/7   Co-morbid conditions: 370/7- 376/7 (see below)    FGR (< 3rd percentile)   Normal testing, no co-morbid conditions: 370/7- 376/7   Co-morbid conditions: 360/7- 376/7 weeks (see below)    Maternal co-morbid conditions - CHTN, pregestational DM, renal disease, autoimmune disease, AMA ? 40    Earlier delivery can be considered in conjunction with MFM in the setting of FGR with preeclampsia/gestational hypertension (360/7-370/7 weeks), multifetal pregnancy, or UA Doppler abnormalities (ie EFW < 3rd percentile with elevated UA Dopplers)     FGR + Oligohydramnios: At diagnosis, if GA ? 34 weeks (if less than 34 weeks, management needs to be individualized based on other testing and entire clinical scenario)  FGR + AEDF: By 34 weeks (33 0/7-34 6/7 weeks) if there is absent diastolic flow in the umbilical artery and there has not been a previous indication for delivery  FGR + REDF: By 32 weeks (30 0/7-32 6/7 weeks), if there is reversed diastolic flow in the umbilical artery and there has not been a previous indication for delivery

## 2023-10-30 NOTE — ASSESSMENT & PLAN NOTE
No problems in pregnancy thus far.  Mat21 low risk    EFW 11%, AC 9% today (See FGR)  /61    Recommendations:   Detailed anatomic survey at 19-20 weeks --reassuring, anatomy completed today   Continue Low dose aspirin for preeclampsia risk reduction

## 2023-10-31 NOTE — PROGRESS NOTES
32 weeks gestation of pregnancy    Doing well, no complaints  Good fetal movement  Some BH. No regular CTX, VB, LOF.   Declines contraception PP  F/u ultrasound today: incomplete breech, posterior placenta, MVP 4.8cm, growth 19%tile with AC of 39%, EFW 4lb 1 oz. Anatomy appears normal, but sub op views of aortic arch view remains. MFM referral placed for follow-up.  14lb 12.3oz TWG  Kick counts and PTL precautions reviewed  RTC in 2 weeks, sooner if needed    Encounter for follow-up ultrasound of fetal anatomy  -     Ambulatory referral/consult to Perinatology; Future; Expected date: 11/01/2023  -     US MFM Procedure ([facility]); Future    Abnormal O'Turner glucose challenge test, antepartum  -     GLUCOSE TOLERANCE, 3 HOURS; Future; Expected date: 10/25/2023    Patient did not pass GTT by 1 point. States she ate candy beforehand, and really does not want to do 3 hour GTT. She is willing to buy a glucometer and take sugars 4x per day. Paper glucose log & instructions given. States she will send logs through the portal in 1 week. Stressed importance of follow-up.     Refuses tetanus, diphtheria, and acellular pertussis (Tdap) vaccination    Influenza vaccine refused    Advised flu shot, declines    Anemia affecting pregnancy in third trimester    Taking iron supplement & has increased leafy greens in her diet

## 2023-11-01 ENCOUNTER — TELEPHONE (OUTPATIENT)
Dept: OBSTETRICS AND GYNECOLOGY | Facility: CLINIC | Age: 38
End: 2023-11-01
Payer: MEDICAID

## 2023-11-01 NOTE — TELEPHONE ENCOUNTER
S/w pt and advised twice weekly testing appts are scheduled as ordered per MFM, including 3wk fu to check growth.Informed her that while most visits are scheduled according to her location preference of [place] a few are at alternative locations due to appt availability. Reminded her to review her Mychart for appt details, including location & address prior to her visit. Pt voiced understanding and appreciation of call to update her. Encouraged to call us with any future questions/concerns.

## 2023-11-03 ENCOUNTER — PROCEDURE VISIT (OUTPATIENT)
Dept: OBSTETRICS AND GYNECOLOGY | Facility: CLINIC | Age: 38
End: 2023-11-03
Payer: MEDICAID

## 2023-11-03 DIAGNOSIS — O09.523 MULTIGRAVIDA OF ADVANCED MATERNAL AGE IN THIRD TRIMESTER: ICD-10-CM

## 2023-11-03 DIAGNOSIS — Z36.2 ENCOUNTER FOR FOLLOW-UP ULTRASOUND OF FETAL ANATOMY: ICD-10-CM

## 2023-11-03 PROCEDURE — 76819 FETAL BIOPHYS PROFIL W/O NST: CPT | Mod: PBBFAC | Performed by: OBSTETRICS & GYNECOLOGY

## 2023-11-03 PROCEDURE — 76819 US OB/GYN PROCEDURE (VIEWPOINT): ICD-10-PCS | Mod: 26,S$PBB,, | Performed by: OBSTETRICS & GYNECOLOGY

## 2023-11-07 ENCOUNTER — PATIENT MESSAGE (OUTPATIENT)
Dept: OBSTETRICS AND GYNECOLOGY | Facility: CLINIC | Age: 38
End: 2023-11-07

## 2023-11-07 ENCOUNTER — PATIENT MESSAGE (OUTPATIENT)
Dept: OBSTETRICS AND GYNECOLOGY | Facility: CLINIC | Age: 38
End: 2023-11-07
Payer: MEDICAID

## 2023-11-07 ENCOUNTER — ROUTINE PRENATAL (OUTPATIENT)
Dept: OBSTETRICS AND GYNECOLOGY | Facility: CLINIC | Age: 38
End: 2023-11-07
Payer: MEDICAID

## 2023-11-07 ENCOUNTER — PROCEDURE VISIT (OUTPATIENT)
Dept: OBSTETRICS AND GYNECOLOGY | Facility: CLINIC | Age: 38
End: 2023-11-07
Payer: MEDICAID

## 2023-11-07 VITALS — BODY MASS INDEX: 41.63 KG/M2 | DIASTOLIC BLOOD PRESSURE: 70 MMHG | WEIGHT: 242.5 LBS | SYSTOLIC BLOOD PRESSURE: 112 MMHG

## 2023-11-07 DIAGNOSIS — O99.810 ABNORMAL O'SULLIVAN GLUCOSE CHALLENGE TEST, ANTEPARTUM: ICD-10-CM

## 2023-11-07 DIAGNOSIS — F32.A ANXIETY AND DEPRESSION: ICD-10-CM

## 2023-11-07 DIAGNOSIS — F41.9 ANXIETY AND DEPRESSION: ICD-10-CM

## 2023-11-07 DIAGNOSIS — O36.5990 FETAL GROWTH RESTRICTION ANTEPARTUM: ICD-10-CM

## 2023-11-07 DIAGNOSIS — O09.523 MULTIGRAVIDA OF ADVANCED MATERNAL AGE IN THIRD TRIMESTER: ICD-10-CM

## 2023-11-07 DIAGNOSIS — O99.013 ANEMIA AFFECTING PREGNANCY IN THIRD TRIMESTER: ICD-10-CM

## 2023-11-07 DIAGNOSIS — O09.523 MULTIGRAVIDA OF ADVANCED MATERNAL AGE IN THIRD TRIMESTER: Primary | ICD-10-CM

## 2023-11-07 PROCEDURE — 76820 US OB/GYN PROCEDURE (VIEWPOINT): ICD-10-PCS | Mod: 26,S$PBB,, | Performed by: OBSTETRICS & GYNECOLOGY

## 2023-11-07 PROCEDURE — 99214 OFFICE O/P EST MOD 30 MIN: CPT | Mod: TH,S$PBB,, | Performed by: ADVANCED PRACTICE MIDWIFE

## 2023-11-07 PROCEDURE — 99999 PR PBB SHADOW E&M-EST. PATIENT-LVL II: CPT | Mod: PBBFAC,,, | Performed by: ADVANCED PRACTICE MIDWIFE

## 2023-11-07 PROCEDURE — 99999 PR PBB SHADOW E&M-EST. PATIENT-LVL II: ICD-10-PCS | Mod: PBBFAC,,, | Performed by: ADVANCED PRACTICE MIDWIFE

## 2023-11-07 PROCEDURE — 99212 OFFICE O/P EST SF 10 MIN: CPT | Mod: PBBFAC,25,TH | Performed by: ADVANCED PRACTICE MIDWIFE

## 2023-11-07 PROCEDURE — 76819 US OB/GYN PROCEDURE (VIEWPOINT): ICD-10-PCS | Mod: 26,S$PBB,, | Performed by: OBSTETRICS & GYNECOLOGY

## 2023-11-07 PROCEDURE — 99214 PR OFFICE/OUTPT VISIT, EST, LEVL IV, 30-39 MIN: ICD-10-PCS | Mod: TH,S$PBB,, | Performed by: ADVANCED PRACTICE MIDWIFE

## 2023-11-07 PROCEDURE — 76819 FETAL BIOPHYS PROFIL W/O NST: CPT | Mod: PBBFAC | Performed by: OBSTETRICS & GYNECOLOGY

## 2023-11-07 PROCEDURE — 76820 UMBILICAL ARTERY ECHO: CPT | Mod: 26,S$PBB,, | Performed by: OBSTETRICS & GYNECOLOGY

## 2023-11-10 ENCOUNTER — PROCEDURE VISIT (OUTPATIENT)
Dept: OBSTETRICS AND GYNECOLOGY | Facility: CLINIC | Age: 38
End: 2023-11-10
Payer: MEDICAID

## 2023-11-10 DIAGNOSIS — O09.523 MULTIGRAVIDA OF ADVANCED MATERNAL AGE IN THIRD TRIMESTER: ICD-10-CM

## 2023-11-10 DIAGNOSIS — O36.5990 FETAL GROWTH RESTRICTION ANTEPARTUM: ICD-10-CM

## 2023-11-10 PROCEDURE — 76819 FETAL BIOPHYS PROFIL W/O NST: CPT | Mod: PBBFAC | Performed by: OBSTETRICS & GYNECOLOGY

## 2023-11-10 PROCEDURE — 76819 US OB/GYN PROCEDURE (VIEWPOINT): ICD-10-PCS | Mod: 26,S$PBB,, | Performed by: OBSTETRICS & GYNECOLOGY

## 2023-11-11 ENCOUNTER — HOSPITAL ENCOUNTER (OUTPATIENT)
Facility: HOSPITAL | Age: 38
Discharge: HOME | End: 2023-11-11
Attending: OBSTETRICS & GYNECOLOGY | Admitting: OBSTETRICS & GYNECOLOGY
Payer: MEDICAID

## 2023-11-11 VITALS
TEMPERATURE: 98 F | HEART RATE: 87 BPM | RESPIRATION RATE: 19 BRPM | DIASTOLIC BLOOD PRESSURE: 68 MMHG | OXYGEN SATURATION: 100 % | SYSTOLIC BLOOD PRESSURE: 125 MMHG

## 2023-11-11 DIAGNOSIS — O47.03 THREATENED PREMATURE LABOR IN THIRD TRIMESTER: Primary | ICD-10-CM

## 2023-11-11 DIAGNOSIS — O47.00 THREATENED PREMATURE LABOR: ICD-10-CM

## 2023-11-11 LAB
BILIRUB UR QL STRIP: NEGATIVE
CLARITY UR: CLEAR
COLOR UR: YELLOW
GLUCOSE UR QL STRIP: NEGATIVE
HGB UR QL STRIP: NEGATIVE
KETONES UR QL STRIP: ABNORMAL
LEUKOCYTE ESTERASE UR QL STRIP: NEGATIVE
NITRITE UR QL STRIP: NEGATIVE
PH UR STRIP: 6 [PH] (ref 5–8)
POCT GLUCOSE: 86 MG/DL (ref 70–110)
PROT UR QL STRIP: ABNORMAL
SP GR UR STRIP: 1.02 (ref 1–1.03)
URN SPEC COLLECT METH UR: ABNORMAL
UROBILINOGEN UR STRIP-ACNC: NEGATIVE EU/DL

## 2023-11-11 PROCEDURE — 99211 OFF/OP EST MAY X REQ PHY/QHP: CPT

## 2023-11-11 PROCEDURE — 59025 OBTAIN FETAL NONSTRESS TEST (NST): ICD-10-PCS | Mod: 26,,, | Performed by: ADVANCED PRACTICE MIDWIFE

## 2023-11-11 PROCEDURE — 59025 FETAL NON-STRESS TEST: CPT | Mod: 26,,, | Performed by: ADVANCED PRACTICE MIDWIFE

## 2023-11-11 PROCEDURE — 99213 PR OFFICE/OUTPT VISIT, EST, LEVL III, 20-29 MIN: ICD-10-PCS | Mod: TH,25,, | Performed by: ADVANCED PRACTICE MIDWIFE

## 2023-11-11 PROCEDURE — 99213 OFFICE O/P EST LOW 20 MIN: CPT | Mod: TH,25,, | Performed by: ADVANCED PRACTICE MIDWIFE

## 2023-11-11 PROCEDURE — 81003 URINALYSIS AUTO W/O SCOPE: CPT | Performed by: OBSTETRICS & GYNECOLOGY

## 2023-11-11 NOTE — HOSPITAL COURSE
EFM / NST  SVE  No regular contractions or significant cervical change.  Discharged to home with labor precautions.  Continue with bi-weekly visits

## 2023-11-11 NOTE — H&P
[facility]  Obstetrics  History & Physical    Patient Name: Brianna Aguirre  MRN: [id number]  Admission Date: 2023  Primary Care Provider: Mary Frias NP    Subjective:     Principal Problem:Threatened premature labor in third trimester    History of Present Illness:  Presents with C/O contractions      Obstetric HPI:  Patient reports  contractions, active fetal movement, No vaginal bleeding , No loss of fluid     This pregnancy has been complicated by AMA, obesity, anxiety, anemia, FGR    OB History    Para Term  AB Living   4 3 3 0 0 3   SAB IAB Ectopic Multiple Live Births   0 0 0 0 3      # Outcome Date GA Lbr Ed/2nd Weight Sex Delivery Anes PTL Lv   4 Current            3 Term 09 39w0d   M Vag-Spont EPI  JAIRO   2 Term 07 38w0d   M Vag-Spont EPI  JAIRO   1 Term 10/23/06 38w0d   F Vag-Spont EPI  JAIRO     Past Medical History:   Diagnosis Date    Mental disorder     ADHD    Mild anemia 2023     History reviewed. No pertinent surgical history.    PTA Medications   Medication Sig    aspirin 81 MG Chew Take 1 tablet (81 mg total) by mouth once daily.    buPROPion (WELLBUTRIN XL) 150 MG TB24 tablet Take 150 mg by mouth every morning.    ferrous sulfate (FEOSOL) 325 mg (65 mg iron) Tab tablet Take 1 tablet (325 mg total) by mouth 2 (two) times daily.    FLUoxetine 40 MG capsule Take 40 mg by mouth every morning.       Review of patient's allergies indicates:  No Known Allergies     Family History    None       Tobacco Use    Smoking status: Never     Passive exposure: Never    Smokeless tobacco: Never   Substance and Sexual Activity    Alcohol use: Never    Drug use: Never    Sexual activity: Yes     Partners: Male     Review of Systems   Gastrointestinal:  Positive for abdominal pain.   Genitourinary:  Positive for pelvic pain.   All other systems reviewed and are negative.     Objective:     Vital Signs (Most Recent):  Pulse: 71 (23 0804)  Resp:  20 (23 0644)  BP: 126/80 (23 0803)  SpO2: 98 % (23 0804) Vital Signs (24h Range):  Pulse:  [70-89] 71  Resp:  [20] 20  SpO2:  [98 %-100 %] 98 %  BP: (118-131)/(65-80) 126/80        There is no height or weight on file to calculate BMI.    FHT: Cat 1 (reassuring)  TOCO:  irregular     Physical Exam:   Constitutional: She is oriented to person, place, and time. She appears well-developed and well-nourished.        Pulmonary/Chest: Effort normal.        Abdominal: Soft.     Genitourinary:    Vagina and uterus normal.             Musculoskeletal: Normal range of motion and moves all extremeties.       Neurological: She is alert and oriented to person, place, and time.    Skin: Skin is warm and dry.    Psychiatric: She has a normal mood and affect. Her behavior is normal. Judgment and thought content normal.        Cervix:  60/V/-3     Significant Labs:  Lab Results   Component Value Date    GROUPTRH B POS 2023    HEPBSAG Non-reactive 2023       I have personallly reviewed all pertinent lab results from the last 24 hours.    Assessment/Plan:     38 y.o. female  at 35w0d for:    * Threatened premature labor in third trimester  EFM / NST  SVE    Fetal growth restriction antepartum  Followed with twice weekly testing    Abnormal O'Turner glucose challenge test, antepartum  Admit BS 86      Kaylynn Pruett CNM  Obstetrics  [facility]

## 2023-11-11 NOTE — DISCHARGE INSTRUCTIONS

## 2023-11-11 NOTE — PROCEDURES
Brianna Aguirre is a 38 y.o. female patient.    Pulse: 71 (11/11/23 0804)  Resp: 20 (11/11/23 0644)  BP: 126/80 (11/11/23 0803)  SpO2: 98 % (11/11/23 0804)       Obtain Fetal nonstress test (NST)    Date/Time: 11/11/2023 8:38 AM    Performed by: Kaylynn Pruett CNM  Authorized by: Kaylynn Pruett CNM    Nonstress Test:     Variability:  6-25 BPM    Decelerations:  None    Accelerations:  15 bpm    Acoustic Stimulator: No      Uterine Irritability: No      Contractions:  Irregular  Biophysical Profile:     Nonstress Test Interpretation: reactive      Overall Impression:  Reassuring      11/11/2023

## 2023-11-11 NOTE — SUBJECTIVE & OBJECTIVE
Obstetric HPI:  Patient reports  contractions, active fetal movement, No vaginal bleeding , No loss of fluid     This pregnancy has been complicated by AMA, obesity, anxiety, anemia, FGR    OB History    Para Term  AB Living   4 3 3 0 0 3   SAB IAB Ectopic Multiple Live Births   0 0 0 0 3      # Outcome Date GA Lbr Ed/2nd Weight Sex Delivery Anes PTL Lv   4 Current            3 Term 09 39w0d   M Vag-Spont EPI  JAIRO   2 Term 07 38w0d   M Vag-Spont EPI  JAIRO   1 Term 10/23/06 38w0d   F Vag-Spont EPI  JAIRO     Past Medical History:   Diagnosis Date    Mental disorder     ADHD    Mild anemia 2023     History reviewed. No pertinent surgical history.    PTA Medications   Medication Sig    aspirin 81 MG Chew Take 1 tablet (81 mg total) by mouth once daily.    buPROPion (WELLBUTRIN XL) 150 MG TB24 tablet Take 150 mg by mouth every morning.    ferrous sulfate (FEOSOL) 325 mg (65 mg iron) Tab tablet Take 1 tablet (325 mg total) by mouth 2 (two) times daily.    FLUoxetine 40 MG capsule Take 40 mg by mouth every morning.       Review of patient's allergies indicates:  No Known Allergies     Family History    None       Tobacco Use    Smoking status: Never     Passive exposure: Never    Smokeless tobacco: Never   Substance and Sexual Activity    Alcohol use: Never    Drug use: Never    Sexual activity: Yes     Partners: Male     Review of Systems   Gastrointestinal:  Positive for abdominal pain.   Genitourinary:  Positive for pelvic pain.   All other systems reviewed and are negative.     Objective:     Vital Signs (Most Recent):  Pulse: 71 (23 0804)  Resp: 20 (23 0644)  BP: 126/80 (23 0803)  SpO2: 98 % (23 0804) Vital Signs (24h Range):  Pulse:  [70-89] 71  Resp:  [20] 20  SpO2:  [98 %-100 %] 98 %  BP: (118-131)/(65-80) 126/80        There is no height or weight on file to calculate BMI.    FHT: Cat 1 (reassuring)  TOCO:  irregular     Physical Exam:   Constitutional: She is  oriented to person, place, and time. She appears well-developed and well-nourished.        Pulmonary/Chest: Effort normal.        Abdominal: Soft.     Genitourinary:    Vagina and uterus normal.             Musculoskeletal: Normal range of motion and moves all extremeties.       Neurological: She is alert and oriented to person, place, and time.    Skin: Skin is warm and dry.    Psychiatric: She has a normal mood and affect. Her behavior is normal. Judgment and thought content normal.        Cervix:  1/60/V/-3     Significant Labs:  Lab Results   Component Value Date    [facility]/26[facility]    HEPBSAG Non-reactive 07/26/2023       I have personallly reviewed all pertinent lab results from the last 24 hours.

## 2023-11-13 NOTE — PROGRESS NOTES
38 y.o. female  at 34w3d   Reports + FM, denies VB, LOF or regular CTX  Doing well without concerns   /70   Wt 110 kg (242 lb 8.1 oz)   LMP 03/15/2023 (Exact Date)   BMI 41.63 kg/m²   TW lbs     Multigravida of advanced maternal age in third trimester  - 39y/o  Fetal growth restriction antepartum  - Last growth EFW 11%, AC 9%, see MFM recommendations  - Continue 2x/wk BPPs  - Ultrasound today with cephalic presentation, posterior placenta, 3VC, ESTELLE WNL, MVP 5.3cm, BPP 8/8, S/D ratio 34%, reviewed with pt   Anemia affecting pregnancy in third trimester  - Taking iron Daily   Anxiety and depression  - Wellbutrin   Abnormal O'Turner glucose challenge test, antepartum  - Pt declined 3hr gtt, did not bring logs, possible consideration of GDM       Reviewed warning signs, normal FKCs, labor precautions and how/when to call.  RTC x 2 wks, call or present sooner prn.

## 2023-11-14 ENCOUNTER — PROCEDURE VISIT (OUTPATIENT)
Dept: OBSTETRICS AND GYNECOLOGY | Facility: CLINIC | Age: 38
End: 2023-11-14
Payer: MEDICAID

## 2023-11-14 ENCOUNTER — ROUTINE PRENATAL (OUTPATIENT)
Dept: OBSTETRICS AND GYNECOLOGY | Facility: CLINIC | Age: 38
End: 2023-11-14
Payer: MEDICAID

## 2023-11-14 VITALS
WEIGHT: 245.13 LBS | BODY MASS INDEX: 42.08 KG/M2 | DIASTOLIC BLOOD PRESSURE: 76 MMHG | SYSTOLIC BLOOD PRESSURE: 120 MMHG

## 2023-11-14 DIAGNOSIS — O99.013 ANEMIA AFFECTING PREGNANCY IN THIRD TRIMESTER: Primary | ICD-10-CM

## 2023-11-14 DIAGNOSIS — O36.5990 FETAL GROWTH RESTRICTION ANTEPARTUM: ICD-10-CM

## 2023-11-14 DIAGNOSIS — O09.523 MULTIGRAVIDA OF ADVANCED MATERNAL AGE IN THIRD TRIMESTER: ICD-10-CM

## 2023-11-14 PROBLEM — D64.9 MILD ANEMIA: Status: RESOLVED | Noted: 2023-08-07 | Resolved: 2023-11-14

## 2023-11-14 PROCEDURE — 99214 OFFICE O/P EST MOD 30 MIN: CPT | Mod: TH,S$PBB,, | Performed by: MIDWIFE

## 2023-11-14 PROCEDURE — 99999 PR PBB SHADOW E&M-EST. PATIENT-LVL II: ICD-10-PCS | Mod: PBBFAC,,, | Performed by: MIDWIFE

## 2023-11-14 PROCEDURE — 76819 US OB/GYN PROCEDURE (VIEWPOINT): ICD-10-PCS | Mod: 26,S$PBB,, | Performed by: OBSTETRICS & GYNECOLOGY

## 2023-11-14 PROCEDURE — 99212 OFFICE O/P EST SF 10 MIN: CPT | Mod: PBBFAC,TH | Performed by: MIDWIFE

## 2023-11-14 PROCEDURE — 76820 UMBILICAL ARTERY ECHO: CPT | Mod: 26,S$PBB,, | Performed by: OBSTETRICS & GYNECOLOGY

## 2023-11-14 PROCEDURE — 76820 US OB/GYN PROCEDURE (VIEWPOINT): ICD-10-PCS | Mod: 26,S$PBB,, | Performed by: OBSTETRICS & GYNECOLOGY

## 2023-11-14 PROCEDURE — 99999 PR PBB SHADOW E&M-EST. PATIENT-LVL II: CPT | Mod: PBBFAC,,, | Performed by: MIDWIFE

## 2023-11-14 PROCEDURE — 76819 FETAL BIOPHYS PROFIL W/O NST: CPT | Mod: PBBFAC | Performed by: OBSTETRICS & GYNECOLOGY

## 2023-11-14 PROCEDURE — 99214 PR OFFICE/OUTPT VISIT, EST, LEVL IV, 30-39 MIN: ICD-10-PCS | Mod: TH,S$PBB,, | Performed by: MIDWIFE

## 2023-11-14 NOTE — PROGRESS NOTES
38 y.o. female  at 35w3d   Reports + FM, denies VB, LOF or regular CTX  Doing well without concerns   TW lbs   Multigravida of advanced maternal age in third trimester  - 37y/o  Fetal growth restriction antepartum  - Last growth EFW 11%, AC 9%, see MFM recommendations  - Continue 2x/wk BPPs  - Ultrasound today with cephalic presentation, posterior placenta, 3VC, ESTELLE WNL, MVP 3.8cm, BPP 8/8, S/D ratio 45%, reviewed with pt   Anemia affecting pregnancy in third trimester  - Taking iron Daily   Anxiety and depression  - Wellbutrin   Abnormal O'Turner glucose challenge test, antepartum  - Pt declined 3hr gtt, did not bring logs, possible consideration of GDM    Reviewed warning signs, normal FKCs, labor precautions and how/when to call.  RTC x 3 days, call or present sooner prn.

## 2023-11-16 ENCOUNTER — PATIENT MESSAGE (OUTPATIENT)
Dept: OBSTETRICS AND GYNECOLOGY | Facility: CLINIC | Age: 38
End: 2023-11-16
Payer: MEDICAID

## 2023-11-21 ENCOUNTER — ROUTINE PRENATAL (OUTPATIENT)
Dept: OBSTETRICS AND GYNECOLOGY | Facility: CLINIC | Age: 38
End: 2023-11-21
Payer: MEDICAID

## 2023-11-21 ENCOUNTER — PROCEDURE VISIT (OUTPATIENT)
Dept: OBSTETRICS AND GYNECOLOGY | Facility: CLINIC | Age: 38
End: 2023-11-21
Payer: MEDICAID

## 2023-11-21 VITALS
DIASTOLIC BLOOD PRESSURE: 76 MMHG | WEIGHT: 248.69 LBS | BODY MASS INDEX: 42.69 KG/M2 | SYSTOLIC BLOOD PRESSURE: 118 MMHG

## 2023-11-21 DIAGNOSIS — O99.013 ANEMIA AFFECTING PREGNANCY IN THIRD TRIMESTER: ICD-10-CM

## 2023-11-21 DIAGNOSIS — O09.523 MULTIGRAVIDA OF ADVANCED MATERNAL AGE IN THIRD TRIMESTER: ICD-10-CM

## 2023-11-21 DIAGNOSIS — F32.A ANXIETY AND DEPRESSION: ICD-10-CM

## 2023-11-21 DIAGNOSIS — F41.9 ANXIETY AND DEPRESSION: ICD-10-CM

## 2023-11-21 DIAGNOSIS — O36.5990 FETAL GROWTH RESTRICTION ANTEPARTUM: ICD-10-CM

## 2023-11-21 DIAGNOSIS — O99.810 ABNORMAL O'SULLIVAN GLUCOSE CHALLENGE TEST, ANTEPARTUM: ICD-10-CM

## 2023-11-21 DIAGNOSIS — O09.523 MULTIGRAVIDA OF ADVANCED MATERNAL AGE IN THIRD TRIMESTER: Primary | ICD-10-CM

## 2023-11-21 PROCEDURE — 99999 PR PBB SHADOW E&M-EST. PATIENT-LVL III: CPT | Mod: PBBFAC,,, | Performed by: ADVANCED PRACTICE MIDWIFE

## 2023-11-21 PROCEDURE — 76820 UMBILICAL ARTERY ECHO: CPT | Mod: 26,S$PBB,, | Performed by: OBSTETRICS & GYNECOLOGY

## 2023-11-21 PROCEDURE — 76816 US OB/GYN PROCEDURE (VIEWPOINT): ICD-10-PCS | Mod: 26,S$PBB,, | Performed by: OBSTETRICS & GYNECOLOGY

## 2023-11-21 PROCEDURE — 76816 OB US FOLLOW-UP PER FETUS: CPT | Mod: 26,S$PBB,, | Performed by: OBSTETRICS & GYNECOLOGY

## 2023-11-21 PROCEDURE — 99213 OFFICE O/P EST LOW 20 MIN: CPT | Mod: PBBFAC,25,TH,PN | Performed by: ADVANCED PRACTICE MIDWIFE

## 2023-11-21 PROCEDURE — 87147 CULTURE TYPE IMMUNOLOGIC: CPT | Performed by: ADVANCED PRACTICE MIDWIFE

## 2023-11-21 PROCEDURE — 99999 PR PBB SHADOW E&M-EST. PATIENT-LVL III: ICD-10-PCS | Mod: PBBFAC,,, | Performed by: ADVANCED PRACTICE MIDWIFE

## 2023-11-21 PROCEDURE — 76819 US OB/GYN PROCEDURE (VIEWPOINT): ICD-10-PCS | Mod: 26,S$PBB,, | Performed by: OBSTETRICS & GYNECOLOGY

## 2023-11-21 PROCEDURE — 99213 PR OFFICE/OUTPT VISIT, EST, LEVL III, 20-29 MIN: ICD-10-PCS | Mod: TH,S$PBB,, | Performed by: ADVANCED PRACTICE MIDWIFE

## 2023-11-21 PROCEDURE — 76819 FETAL BIOPHYS PROFIL W/O NST: CPT | Mod: PBBFAC,PN | Performed by: OBSTETRICS & GYNECOLOGY

## 2023-11-21 PROCEDURE — 87081 CULTURE SCREEN ONLY: CPT | Performed by: ADVANCED PRACTICE MIDWIFE

## 2023-11-21 PROCEDURE — 76820 US OB/GYN PROCEDURE (VIEWPOINT): ICD-10-PCS | Mod: 26,S$PBB,, | Performed by: OBSTETRICS & GYNECOLOGY

## 2023-11-21 PROCEDURE — 99213 OFFICE O/P EST LOW 20 MIN: CPT | Mod: TH,S$PBB,, | Performed by: ADVANCED PRACTICE MIDWIFE

## 2023-11-21 PROCEDURE — 87184 SC STD DISK METHOD PER PLATE: CPT | Performed by: ADVANCED PRACTICE MIDWIFE

## 2023-11-21 NOTE — PATIENT INSTRUCTIONS
Patient Education       Pregnancy - The Ninth Month   About this topic   It is important for you to learn how to take care of yourself to help you have a healthy baby and safe delivery. It is good to have health care throughout your pregnancy.  The ninth month of your pregnancy starts around week 37 and lasts through delivery. By knowing how far along you are, you can learn what is normal for this stage of your pregnancy and plan for what is next.  General   Your body   During the ninth month of your pregnancy, here are some things you can expect.  You may:  Lose your mucous plug as your cervix starts to get thinner and dilate.  Notice a small amount of streaky red or pink spotting.  Your doctor may discuss stripping your membranes to help the labor progress.  Go into labor any time. Most women deliver their baby between 38 and 42 weeks.  Notice your belly button sticks out. It should go back to normal after you give birth.  Have a bit of extra energy as you get ready for your baby  Notice your breasts are leaking more. This is normal as your body is making the first milk you can feed your baby.  Have tests to check on how your baby is doing. Your doctor will most likely not let you be pregnant for more than 42 weeks.  Not gain any weight this month. Some mothers even lose 1 to 2 pounds (.45 to .9 kg).  Your baby's growth and development:  Your baby has been busy swallowing fluid and building up waste products for their first bowel movement.  Your baby may start sucking their thumb.  They may come out with dry skin, bruises, or a misshapen head. Living in a watery fluid and going through labor is tough on your baby too. These are all normal and will change in the first weeks of life.  Your baby may have lots of hair on their head or not very much at all. Long fingernails are normal.  Your baby is about 20 inches (51 cm) long and weighs about 7 1/2 pounds (3,400 gm). Your baby is about the size of a  watermelon.  Things to Think About   Avoid alcohol, drugs, tobacco products, and second hand smoke.  Talk to your doctor if you plan to travel or get on a plane.  Have your bag packed so you are ready for delivery.  Are you planning a natural childbirth or thinking about an epidural? Know things you can do to help cope with labor pain.  If you are having a boy, decide if you want to have him circumcised.  Be sure the car seat is installed the right way so you are ready to bring your baby home.  When do I need to call the doctor?   Contractions every 5 minutes or more often that do not go away with rest, drinking water, or position changes  Headache that does not go away; blurry vision; seeing spots or halos; increase in swelling in your hands, feet, or face; and pain under your ribs on the right side  Low, dull back pain that does not go away  Pressure in your pelvis that feels like your baby is pushing down  A gush or constant trickle of watery or bloody fluid leaking from your vagina  Little to no movement felt by baby in 2 hours. Your baby should be moving at least 10 times every 2 hours.  Vaginal bleeding with or without pain  Fever of 100.4°F (38°C) or higher  After a car accident, fall, or any trauma to your belly  Having thoughts of harming yourself or others, or do not feel safe at home  Where can I learn more?   American Academy of Family Physicians  https://familydoctor.org/changes-in-your-body-during-pregnancy-third-trimester/   Kids Health  http://kidshealth.org/en/parents/pregnancy-calendar-intro.html?WT.ac=p-gagan   Office on Womens Health  https://www.womenshealth.gov/pregnancy/youre-pregnant-now-what/stages-pregnancy   Last Reviewed Date   2020-05-06  Consumer Information Use and Disclaimer   This information is not specific medical advice and does not replace information you receive from your health care provider. This is only a brief summary of general information. It does NOT include all  information about conditions, illnesses, injuries, tests, procedures, treatments, therapies, discharge instructions or life-style choices that may apply to you. You must talk with your health care provider for complete information about your health and treatment options. This information should not be used to decide whether or not to accept your health care providers advice, instructions or recommendations. Only your health care provider has the knowledge and training to provide advice that is right for you.  Copyright   Copyright © 2021 [employer] Inc. and its affiliates and/or licensors. All rights reserved.  Patient Education       Fetal Movement   About this topic   Feeling your baby move for the first time is a good sign that your baby is doing well. You may begin to feel these movements between the 18th and 25th weeks of your pregnancy. If this is your first time being pregnant, it may be closer to 25 weeks. Your baby has been moving around before this, but the kicks have not been strong enough for you to feel. During the first weeks of feeling movement, you may start to see a pattern during the day when your baby is most active. You can track your baby's kicks each day at home. This is also known as kick counting. It is a good way to check on your baby's movements and well being.  Most often, fetal kick counting is used in high-risk pregnancies. It may be useful for all pregnancies. Counting and writing down your baby's kicks, jabs, twists, flutters, rolls, turns, flips, and swishes may help find a problem that needs more evaluation. The American College of Obstetricians and Gynecologists, or ACOG, suggests that you record how much time it takes you to feel 10 of these movements. Ideally, you should be able to feel 10 movements within 2 hours. Many people will track these movements in much less time.  General   How to Track Your Baby's Kick Counts   Most often your doctor will want you to wait until the 28th  "to 30th weeks of your pregnancy to start kick counting. Here are some tips to help you get started.  Find the time of day when your baby is most active. For some people, this is right after eating. Others find their baby moving a lot after they have been exercising or more active. Some babies are more active in the evenings when your blood sugar starts to lower.  Try to count kicks at about the same time each day.  Before you start counting, have something to eat or drink. Also take a short walk or do some light activity.  Choose a quiet place where you can focus on your baby's movements. Also get in a comfortable position. Try and lie on one side or the other. You may need to change positions until you find one that works best for you and your baby.  Keep a notebook to track your baby's kicks. Your doctor may give you a chart to use or you can make your own. Write down the date, time you started counting, and the time of each "kick" during a 2-hour period until you have felt 10 kicks.  Once you have recorded 10 kicks within 2 hours you can stop counting.  If you are not able to record 10 movements over 2 hours you should get up and move around or eat something and try again.  If you are not able to record 10 movements over 2 hours the second time, call your doctor. They may want you to go to the hospital to get your baby checked.  When do I need to call the doctor?   You have felt less than 10 movements over a period of 2 hours.  It takes longer each day to record 10 movements.  There is a big change in the pattern of movements you are writing down.  You feel no movement for 2 hours even after eating a snack, light activity, and position changes.  Teach Back: Helping You Understand   The Teach Back Method helps you understand the information we are giving you. After you talk with the staff, tell them in your own words what you learned. This helps to make sure the staff has described each thing clearly. It also " helps to explain things that may have been confusing. Before going home, make sure you can do these:  I can tell you about feeling my baby move.  I can tell you how I will track my babys kicks.  I can tell you what I will do if I feel less than 10 movements in 2 hours, it takes longer to feel my baby move 10 times, or there is a big change in how my baby is moving.  Last Reviewed Date   2021-10-08  Consumer Information Use and Disclaimer   This information is not specific medical advice and does not replace information you receive from your health care provider. This is only a brief summary of general information. It does NOT include all information about conditions, illnesses, injuries, tests, procedures, treatments, therapies, discharge instructions or life-style choices that may apply to you. You must talk with your health care provider for complete information about your health and treatment options. This information should not be used to decide whether or not to accept your health care providers advice, instructions or recommendations. Only your health care provider has the knowledge and training to provide advice that is right for you.  Copyright   Copyright © 2021 [employer] and its affiliates and/or licensors. All rights reserved.

## 2023-11-24 ENCOUNTER — PROCEDURE VISIT (OUTPATIENT)
Dept: OBSTETRICS AND GYNECOLOGY | Facility: CLINIC | Age: 38
End: 2023-11-24
Payer: MEDICAID

## 2023-11-24 ENCOUNTER — PATIENT MESSAGE (OUTPATIENT)
Dept: OBSTETRICS AND GYNECOLOGY | Facility: CLINIC | Age: 38
End: 2023-11-24
Payer: MEDICAID

## 2023-11-24 DIAGNOSIS — O09.523 MULTIGRAVIDA OF ADVANCED MATERNAL AGE IN THIRD TRIMESTER: ICD-10-CM

## 2023-11-24 DIAGNOSIS — O36.5990 FETAL GROWTH RESTRICTION ANTEPARTUM: ICD-10-CM

## 2023-11-24 PROCEDURE — 76819 FETAL BIOPHYS PROFIL W/O NST: CPT | Mod: PBBFAC | Performed by: OBSTETRICS & GYNECOLOGY

## 2023-11-24 PROCEDURE — 76819 US OB/GYN PROCEDURE (VIEWPOINT): ICD-10-PCS | Mod: 26,S$PBB,, | Performed by: OBSTETRICS & GYNECOLOGY

## 2023-11-24 RX ORDER — OXYTOCIN/RINGER'S LACTATE 30/500 ML
95 PLASTIC BAG, INJECTION (ML) INTRAVENOUS ONCE AS NEEDED
Status: CANCELLED | OUTPATIENT
Start: 2023-11-24 | End: 2035-04-22

## 2023-11-24 RX ORDER — OXYTOCIN/RINGER'S LACTATE 30/500 ML
334 PLASTIC BAG, INJECTION (ML) INTRAVENOUS ONCE AS NEEDED
Status: CANCELLED | OUTPATIENT
Start: 2023-11-24 | End: 2035-04-22

## 2023-11-24 RX ORDER — OXYTOCIN/RINGER'S LACTATE 30/500 ML
0-30 PLASTIC BAG, INJECTION (ML) INTRAVENOUS CONTINUOUS
Status: CANCELLED | OUTPATIENT
Start: 2023-11-24

## 2023-11-24 RX ORDER — OXYTOCIN 10 [USP'U]/ML
10 INJECTION, SOLUTION INTRAMUSCULAR; INTRAVENOUS ONCE AS NEEDED
Status: CANCELLED | OUTPATIENT
Start: 2023-11-24 | End: 2035-04-22

## 2023-11-24 RX ORDER — OXYTOCIN/RINGER'S LACTATE 30/500 ML
95 PLASTIC BAG, INJECTION (ML) INTRAVENOUS ONCE
Status: CANCELLED | OUTPATIENT
Start: 2023-11-24 | End: 2023-11-24

## 2023-11-24 RX ORDER — ONDANSETRON 8 MG/1
8 TABLET, ORALLY DISINTEGRATING ORAL EVERY 8 HOURS PRN
Status: CANCELLED | OUTPATIENT
Start: 2023-11-24

## 2023-11-24 RX ORDER — MUPIROCIN 20 MG/G
OINTMENT TOPICAL
Status: CANCELLED | OUTPATIENT
Start: 2023-11-24

## 2023-11-24 RX ORDER — METHYLERGONOVINE MALEATE 0.2 MG/ML
200 INJECTION INTRAVENOUS ONCE AS NEEDED
Status: CANCELLED | OUTPATIENT
Start: 2023-11-24 | End: 2035-04-22

## 2023-11-24 RX ORDER — SIMETHICONE 80 MG
1 TABLET,CHEWABLE ORAL 4 TIMES DAILY PRN
Status: CANCELLED | OUTPATIENT
Start: 2023-11-24

## 2023-11-24 RX ORDER — OXYTOCIN/RINGER'S LACTATE 30/500 ML
334 PLASTIC BAG, INJECTION (ML) INTRAVENOUS ONCE
Status: CANCELLED | OUTPATIENT
Start: 2023-11-24 | End: 2023-11-24

## 2023-11-24 RX ORDER — CALCIUM CARBONATE 200(500)MG
500 TABLET,CHEWABLE ORAL 3 TIMES DAILY PRN
Status: CANCELLED | OUTPATIENT
Start: 2023-11-24

## 2023-11-24 RX ORDER — LIDOCAINE HYDROCHLORIDE 10 MG/ML
10 INJECTION INFILTRATION; PERINEURAL ONCE AS NEEDED
Status: CANCELLED | OUTPATIENT
Start: 2023-11-24 | End: 2035-04-22

## 2023-11-24 RX ORDER — DIPHENOXYLATE HYDROCHLORIDE AND ATROPINE SULFATE 2.5; .025 MG/1; MG/1
2 TABLET ORAL EVERY 6 HOURS PRN
Status: CANCELLED | OUTPATIENT
Start: 2023-11-24

## 2023-11-24 RX ORDER — MISOPROSTOL 200 UG/1
800 TABLET ORAL ONCE AS NEEDED
Status: CANCELLED | OUTPATIENT
Start: 2023-11-24 | End: 2035-04-22

## 2023-11-24 RX ORDER — MISOPROSTOL 200 UG/1
800 TABLET ORAL ONCE AS NEEDED
Status: CANCELLED | OUTPATIENT
Start: 2023-11-24

## 2023-11-24 RX ORDER — CARBOPROST TROMETHAMINE 250 UG/ML
250 INJECTION, SOLUTION INTRAMUSCULAR
Status: CANCELLED | OUTPATIENT
Start: 2023-11-24

## 2023-11-24 NOTE — PROGRESS NOTES
38 y.o. female  at 36w6d   Reports + FM, denies VB, LOF or regular CTX  First time meeting patient-reviewed prenatal chart together,  Doing well without concerns     AMA-taking daily baby aspirin    Fetal growth restriction-with comorbid condition scheduled for induction of labor Saturday at 5:00 a.m.-orders placed  Ultrasound today-vertex, MVP 6.1 cm, EFW 5 lb 3 oz at 7 percentile with AC at 2 percentile.  BPP 8 8 normal Dopplers and SD ratio 85 percentile    Anemia-taking iron supplement    BMI 40-daily baby aspirin    Anxiety taking Wellbutrin 150 mg daily    TW lbs   Reviewed warning signs, normal FKCs, labor precautions and how/when to call.      I spent a total of 20 minutes on the day of the visit.This includes face to face time and non-face to face time preparing to see the patient (eg, review of tests), Obtaining and/or reviewing separately obtained history, Documenting clinical information in the electronic or other health record, Independently interpreting resultsand communicating results to the patient/family/caregiver, or Care coordination.

## 2023-11-25 ENCOUNTER — ANESTHESIA EVENT (OUTPATIENT)
Dept: OBSTETRICS AND GYNECOLOGY | Facility: HOSPITAL | Age: 38
End: 2023-11-25
Payer: MEDICAID

## 2023-11-25 ENCOUNTER — HOSPITAL ENCOUNTER (INPATIENT)
Facility: HOSPITAL | Age: 38
LOS: 2 days | Discharge: HOME | End: 2023-11-27
Attending: OBSTETRICS & GYNECOLOGY | Admitting: OBSTETRICS & GYNECOLOGY
Payer: MEDICAID

## 2023-11-25 ENCOUNTER — ANESTHESIA (OUTPATIENT)
Dept: OBSTETRICS AND GYNECOLOGY | Facility: HOSPITAL | Age: 38
End: 2023-11-25
Payer: MEDICAID

## 2023-11-25 DIAGNOSIS — O09.523 MULTIGRAVIDA OF ADVANCED MATERNAL AGE IN THIRD TRIMESTER: ICD-10-CM

## 2023-11-25 DIAGNOSIS — O36.5990 FETAL GROWTH RESTRICTION ANTEPARTUM: ICD-10-CM

## 2023-11-25 DIAGNOSIS — F32.A ANXIETY AND DEPRESSION: ICD-10-CM

## 2023-11-25 DIAGNOSIS — O99.810 ABNORMAL O'SULLIVAN GLUCOSE CHALLENGE TEST, ANTEPARTUM: ICD-10-CM

## 2023-11-25 DIAGNOSIS — O99.013 ANEMIA AFFECTING PREGNANCY IN THIRD TRIMESTER: ICD-10-CM

## 2023-11-25 DIAGNOSIS — F41.9 ANXIETY AND DEPRESSION: ICD-10-CM

## 2023-11-25 PROBLEM — Z34.90 ENCOUNTER FOR INDUCTION OF LABOR: Status: RESOLVED | Noted: 2023-11-25 | Resolved: 2023-11-25

## 2023-11-25 PROBLEM — Z34.90 ENCOUNTER FOR INDUCTION OF LABOR: Status: ACTIVE | Noted: 2023-11-25

## 2023-11-25 PROBLEM — O47.03 THREATENED PREMATURE LABOR IN THIRD TRIMESTER: Status: RESOLVED | Noted: 2023-11-11 | Resolved: 2023-11-25

## 2023-11-25 LAB
ABO + RH BLD: NORMAL
ALBUMIN SERPL BCP-MCNC: 2.6 G/DL (ref 3.5–5.2)
ALP SERPL-CCNC: 82 U/L (ref 55–135)
ALT SERPL W/O P-5'-P-CCNC: 11 U/L (ref 10–44)
ANION GAP SERPL CALC-SCNC: 10 MMOL/L (ref 8–16)
AST SERPL-CCNC: 11 U/L (ref 10–40)
BACTERIA SPEC AEROBE CULT: ABNORMAL
BASOPHILS # BLD AUTO: 0.02 K/UL (ref 0–0.2)
BASOPHILS NFR BLD: 0.4 % (ref 0–1.9)
BILIRUB SERPL-MCNC: 0.3 MG/DL (ref 0.1–1)
BLD GP AB SCN CELLS X3 SERPL QL: NORMAL
BUN SERPL-MCNC: 13 MG/DL (ref 6–20)
CALCIUM SERPL-MCNC: 8 MG/DL (ref 8.7–10.5)
CHLORIDE SERPL-SCNC: 107 MMOL/L (ref 95–110)
CO2 SERPL-SCNC: 18 MMOL/L (ref 23–29)
CREAT SERPL-MCNC: 0.7 MG/DL (ref 0.5–1.4)
DIFFERENTIAL METHOD: ABNORMAL
EOSINOPHIL # BLD AUTO: 0.1 K/UL (ref 0–0.5)
EOSINOPHIL NFR BLD: 1.5 % (ref 0–8)
ERYTHROCYTE [DISTWIDTH] IN BLOOD BY AUTOMATED COUNT: 13.9 % (ref 11.5–14.5)
EST. GFR  (NO RACE VARIABLE): >60 ML/MIN/1.73 M^2
GLUCOSE SERPL-MCNC: 92 MG/DL (ref 70–110)
HCT VFR BLD AUTO: 31.4 % (ref 37–48.5)
HGB BLD-MCNC: 10.7 G/DL (ref 12–16)
IMM GRANULOCYTES # BLD AUTO: 0.01 K/UL (ref 0–0.04)
IMM GRANULOCYTES NFR BLD AUTO: 0.2 % (ref 0–0.5)
LYMPHOCYTES # BLD AUTO: 1.2 K/UL (ref 1–4.8)
LYMPHOCYTES NFR BLD: 23.5 % (ref 18–48)
MCH RBC QN AUTO: 30.7 PG (ref 27–31)
MCHC RBC AUTO-ENTMCNC: 34.1 G/DL (ref 32–36)
MCV RBC AUTO: 90 FL (ref 82–98)
MONOCYTES # BLD AUTO: 0.6 K/UL (ref 0.3–1)
MONOCYTES NFR BLD: 10.8 % (ref 4–15)
NEUTROPHILS # BLD AUTO: 3.4 K/UL (ref 1.8–7.7)
NEUTROPHILS NFR BLD: 63.6 % (ref 38–73)
NRBC BLD-RTO: 0 /100 WBC
PLATELET # BLD AUTO: 341 K/UL (ref 150–450)
PMV BLD AUTO: 8.4 FL (ref 9.2–12.9)
POTASSIUM SERPL-SCNC: 3.7 MMOL/L (ref 3.5–5.1)
PROT SERPL-MCNC: 6.4 G/DL (ref 6–8.4)
RBC # BLD AUTO: 3.49 M/UL (ref 4–5.4)
SODIUM SERPL-SCNC: 135 MMOL/L (ref 136–145)
WBC # BLD AUTO: 5.27 K/UL (ref 3.9–12.7)

## 2023-11-25 PROCEDURE — 63600175 PHARM REV CODE 636 W HCPCS: Performed by: NURSE ANESTHETIST, CERTIFIED REGISTERED

## 2023-11-25 PROCEDURE — 63600175 PHARM REV CODE 636 W HCPCS: Performed by: ANESTHESIOLOGY

## 2023-11-25 PROCEDURE — 72200004 HC VAGINAL DELIVERY LEVEL I

## 2023-11-25 PROCEDURE — 27000181 HC CABLE, IUPC

## 2023-11-25 PROCEDURE — 63600175 PHARM REV CODE 636 W HCPCS

## 2023-11-25 PROCEDURE — 85025 COMPLETE CBC W/AUTO DIFF WBC: CPT | Performed by: ADVANCED PRACTICE MIDWIFE

## 2023-11-25 PROCEDURE — 25000003 PHARM REV CODE 250

## 2023-11-25 PROCEDURE — 63600175 PHARM REV CODE 636 W HCPCS: Performed by: OBSTETRICS & GYNECOLOGY

## 2023-11-25 PROCEDURE — 88305 TISSUE EXAM BY PATHOLOGIST: CPT | Performed by: PATHOLOGY

## 2023-11-25 PROCEDURE — 88305 TISSUE EXAM BY PATHOLOGIST: ICD-10-PCS | Mod: 26,,, | Performed by: PATHOLOGY

## 2023-11-25 PROCEDURE — 72100002 HC LABOR CARE, 1ST 8 HOURS

## 2023-11-25 PROCEDURE — 51702 INSERT TEMP BLADDER CATH: CPT

## 2023-11-25 PROCEDURE — 25000003 PHARM REV CODE 250: Performed by: OBSTETRICS & GYNECOLOGY

## 2023-11-25 PROCEDURE — 80053 COMPREHEN METABOLIC PANEL: CPT | Performed by: ADVANCED PRACTICE MIDWIFE

## 2023-11-25 PROCEDURE — 62326 NJX INTERLAMINAR LMBR/SAC: CPT | Performed by: NURSE ANESTHETIST, CERTIFIED REGISTERED

## 2023-11-25 PROCEDURE — 27200710 HC EPIDURAL INFUSION PUMP SET: Performed by: ANESTHESIOLOGY

## 2023-11-25 PROCEDURE — 88305 TISSUE EXAM BY PATHOLOGIST: CPT | Mod: 26,,, | Performed by: PATHOLOGY

## 2023-11-25 PROCEDURE — 25000003 PHARM REV CODE 250: Performed by: NURSE ANESTHETIST, CERTIFIED REGISTERED

## 2023-11-25 PROCEDURE — 86850 RBC ANTIBODY SCREEN: CPT | Performed by: ADVANCED PRACTICE MIDWIFE

## 2023-11-25 PROCEDURE — 63600175 PHARM REV CODE 636 W HCPCS: Performed by: ADVANCED PRACTICE MIDWIFE

## 2023-11-25 PROCEDURE — 11000001 HC ACUTE MED/SURG PRIVATE ROOM

## 2023-11-25 PROCEDURE — 59200 INSERT CERVICAL DILATOR: CPT

## 2023-11-25 PROCEDURE — 72100003 HC LABOR CARE, EA. ADDL. 8 HRS

## 2023-11-25 PROCEDURE — 27800516 HC TRAY, EPIDURAL COMBO: Performed by: ANESTHESIOLOGY

## 2023-11-25 RX ORDER — OXYTOCIN/RINGER'S LACTATE 30/500 ML
95 PLASTIC BAG, INJECTION (ML) INTRAVENOUS ONCE AS NEEDED
Status: DISCONTINUED | OUTPATIENT
Start: 2023-11-25 | End: 2023-11-27 | Stop reason: HOSPADM

## 2023-11-25 RX ORDER — OXYTOCIN/RINGER'S LACTATE 30/500 ML
334 PLASTIC BAG, INJECTION (ML) INTRAVENOUS ONCE AS NEEDED
Status: DISCONTINUED | OUTPATIENT
Start: 2023-11-25 | End: 2023-11-27 | Stop reason: HOSPADM

## 2023-11-25 RX ORDER — MUPIROCIN 20 MG/G
OINTMENT TOPICAL
Status: DISCONTINUED | OUTPATIENT
Start: 2023-11-25 | End: 2023-11-25

## 2023-11-25 RX ORDER — PROCHLORPERAZINE EDISYLATE 5 MG/ML
5 INJECTION INTRAMUSCULAR; INTRAVENOUS EVERY 6 HOURS PRN
Status: DISCONTINUED | OUTPATIENT
Start: 2023-11-25 | End: 2023-11-27 | Stop reason: HOSPADM

## 2023-11-25 RX ORDER — TRANEXAMIC ACID 10 MG/ML
1000 INJECTION, SOLUTION INTRAVENOUS EVERY 30 MIN PRN
Status: DISCONTINUED | OUTPATIENT
Start: 2023-11-25 | End: 2023-11-27 | Stop reason: HOSPADM

## 2023-11-25 RX ORDER — OXYTOCIN/RINGER'S LACTATE 30/500 ML
334 PLASTIC BAG, INJECTION (ML) INTRAVENOUS ONCE
Status: DISCONTINUED | OUTPATIENT
Start: 2023-11-25 | End: 2023-11-25

## 2023-11-25 RX ORDER — TRANEXAMIC ACID 10 MG/ML
1000 INJECTION, SOLUTION INTRAVENOUS EVERY 30 MIN PRN
Status: DISCONTINUED | OUTPATIENT
Start: 2023-11-25 | End: 2023-11-25

## 2023-11-25 RX ORDER — MISOPROSTOL 200 UG/1
800 TABLET ORAL ONCE AS NEEDED
Status: DISCONTINUED | OUTPATIENT
Start: 2023-11-25 | End: 2023-11-25

## 2023-11-25 RX ORDER — OXYTOCIN 10 [USP'U]/ML
10 INJECTION, SOLUTION INTRAMUSCULAR; INTRAVENOUS ONCE AS NEEDED
Status: DISCONTINUED | OUTPATIENT
Start: 2023-11-25 | End: 2023-11-25

## 2023-11-25 RX ORDER — EPHEDRINE SULFATE 50 MG/ML
INJECTION, SOLUTION INTRAVENOUS
Status: DISCONTINUED | OUTPATIENT
Start: 2023-11-25 | End: 2023-11-25

## 2023-11-25 RX ORDER — ROPIVACAINE HYDROCHLORIDE 2 MG/ML
INJECTION, SOLUTION EPIDURAL; INFILTRATION CONTINUOUS PRN
Status: DISCONTINUED | OUTPATIENT
Start: 2023-11-25 | End: 2023-11-25

## 2023-11-25 RX ORDER — OXYTOCIN/RINGER'S LACTATE 30/500 ML
95 PLASTIC BAG, INJECTION (ML) INTRAVENOUS ONCE AS NEEDED
Status: DISCONTINUED | OUTPATIENT
Start: 2023-11-25 | End: 2023-11-25

## 2023-11-25 RX ORDER — SODIUM CHLORIDE 0.9 % (FLUSH) 0.9 %
10 SYRINGE (ML) INJECTION
Status: DISCONTINUED | OUTPATIENT
Start: 2023-11-25 | End: 2023-11-27 | Stop reason: HOSPADM

## 2023-11-25 RX ORDER — MISOPROSTOL 200 UG/1
800 TABLET ORAL ONCE AS NEEDED
Status: DISCONTINUED | OUTPATIENT
Start: 2023-11-25 | End: 2023-11-27 | Stop reason: HOSPADM

## 2023-11-25 RX ORDER — DIPHENOXYLATE HYDROCHLORIDE AND ATROPINE SULFATE 2.5; .025 MG/1; MG/1
2 TABLET ORAL EVERY 6 HOURS PRN
Status: DISCONTINUED | OUTPATIENT
Start: 2023-11-25 | End: 2023-11-25

## 2023-11-25 RX ORDER — OXYTOCIN/RINGER'S LACTATE 30/500 ML
0-30 PLASTIC BAG, INJECTION (ML) INTRAVENOUS CONTINUOUS
Status: DISCONTINUED | OUTPATIENT
Start: 2023-11-25 | End: 2023-11-25

## 2023-11-25 RX ORDER — ONDANSETRON 8 MG/1
8 TABLET, ORALLY DISINTEGRATING ORAL EVERY 8 HOURS PRN
Status: DISCONTINUED | OUTPATIENT
Start: 2023-11-25 | End: 2023-11-27 | Stop reason: HOSPADM

## 2023-11-25 RX ORDER — BUPIVACAINE HYDROCHLORIDE 2.5 MG/ML
INJECTION, SOLUTION EPIDURAL; INFILTRATION; INTRACAUDAL
Status: COMPLETED | OUTPATIENT
Start: 2023-11-25 | End: 2023-11-25

## 2023-11-25 RX ORDER — SIMETHICONE 80 MG
1 TABLET,CHEWABLE ORAL 4 TIMES DAILY PRN
Status: DISCONTINUED | OUTPATIENT
Start: 2023-11-25 | End: 2023-11-25

## 2023-11-25 RX ORDER — DOCUSATE SODIUM 100 MG/1
200 CAPSULE, LIQUID FILLED ORAL 2 TIMES DAILY PRN
Status: DISCONTINUED | OUTPATIENT
Start: 2023-11-25 | End: 2023-11-27 | Stop reason: HOSPADM

## 2023-11-25 RX ORDER — DIPHENHYDRAMINE HCL 25 MG
25 CAPSULE ORAL EVERY 4 HOURS PRN
Status: DISCONTINUED | OUTPATIENT
Start: 2023-11-25 | End: 2023-11-27 | Stop reason: HOSPADM

## 2023-11-25 RX ORDER — HYDROCODONE BITARTRATE AND ACETAMINOPHEN 5; 325 MG/1; MG/1
1 TABLET ORAL EVERY 4 HOURS PRN
Status: DISCONTINUED | OUTPATIENT
Start: 2023-11-25 | End: 2023-11-27 | Stop reason: HOSPADM

## 2023-11-25 RX ORDER — DIPHENHYDRAMINE HYDROCHLORIDE 50 MG/ML
25 INJECTION INTRAMUSCULAR; INTRAVENOUS EVERY 4 HOURS PRN
Status: DISCONTINUED | OUTPATIENT
Start: 2023-11-25 | End: 2023-11-27 | Stop reason: HOSPADM

## 2023-11-25 RX ORDER — HYDROCORTISONE 25 MG/G
CREAM TOPICAL 3 TIMES DAILY PRN
Status: DISCONTINUED | OUTPATIENT
Start: 2023-11-25 | End: 2023-11-27 | Stop reason: HOSPADM

## 2023-11-25 RX ORDER — CARBOPROST TROMETHAMINE 250 UG/ML
250 INJECTION, SOLUTION INTRAMUSCULAR
Status: DISCONTINUED | OUTPATIENT
Start: 2023-11-25 | End: 2023-11-25

## 2023-11-25 RX ORDER — METHYLERGONOVINE MALEATE 0.2 MG/ML
200 INJECTION INTRAVENOUS ONCE AS NEEDED
Status: DISCONTINUED | OUTPATIENT
Start: 2023-11-25 | End: 2023-11-27 | Stop reason: HOSPADM

## 2023-11-25 RX ORDER — METHYLERGONOVINE MALEATE 0.2 MG/ML
200 INJECTION INTRAVENOUS ONCE AS NEEDED
Status: DISCONTINUED | OUTPATIENT
Start: 2023-11-25 | End: 2023-11-25

## 2023-11-25 RX ORDER — PRENATAL WITH FERROUS FUM AND FOLIC ACID 3080; 920; 120; 400; 22; 1.84; 3; 20; 10; 1; 12; 200; 27; 25; 2 [IU]/1; [IU]/1; MG/1; [IU]/1; MG/1; MG/1; MG/1; MG/1; MG/1; MG/1; UG/1; MG/1; MG/1; MG/1; MG/1
1 TABLET ORAL DAILY
Status: DISCONTINUED | OUTPATIENT
Start: 2023-11-26 | End: 2023-11-27 | Stop reason: HOSPADM

## 2023-11-25 RX ORDER — OXYTOCIN/RINGER'S LACTATE 30/500 ML
95 PLASTIC BAG, INJECTION (ML) INTRAVENOUS ONCE
Status: DISCONTINUED | OUTPATIENT
Start: 2023-11-25 | End: 2023-11-27 | Stop reason: HOSPADM

## 2023-11-25 RX ORDER — ACETAMINOPHEN 325 MG/1
650 TABLET ORAL EVERY 6 HOURS PRN
Status: DISCONTINUED | OUTPATIENT
Start: 2023-11-25 | End: 2023-11-27 | Stop reason: HOSPADM

## 2023-11-25 RX ORDER — OXYTOCIN 10 [USP'U]/ML
10 INJECTION, SOLUTION INTRAMUSCULAR; INTRAVENOUS ONCE AS NEEDED
Status: DISCONTINUED | OUTPATIENT
Start: 2023-11-25 | End: 2023-11-27 | Stop reason: HOSPADM

## 2023-11-25 RX ORDER — IBUPROFEN 600 MG/1
600 TABLET ORAL EVERY 6 HOURS
Status: DISCONTINUED | OUTPATIENT
Start: 2023-11-26 | End: 2023-11-27 | Stop reason: HOSPADM

## 2023-11-25 RX ORDER — CARBOPROST TROMETHAMINE 250 UG/ML
250 INJECTION, SOLUTION INTRAMUSCULAR
Status: DISCONTINUED | OUTPATIENT
Start: 2023-11-25 | End: 2023-11-27 | Stop reason: HOSPADM

## 2023-11-25 RX ORDER — OXYTOCIN/RINGER'S LACTATE 30/500 ML
334 PLASTIC BAG, INJECTION (ML) INTRAVENOUS ONCE AS NEEDED
Status: DISCONTINUED | OUTPATIENT
Start: 2023-11-25 | End: 2023-11-25

## 2023-11-25 RX ORDER — OXYTOCIN/RINGER'S LACTATE 30/500 ML
95 PLASTIC BAG, INJECTION (ML) INTRAVENOUS ONCE
Status: DISCONTINUED | OUTPATIENT
Start: 2023-11-25 | End: 2023-11-25

## 2023-11-25 RX ORDER — SIMETHICONE 80 MG
1 TABLET,CHEWABLE ORAL EVERY 6 HOURS PRN
Status: DISCONTINUED | OUTPATIENT
Start: 2023-11-25 | End: 2023-11-27 | Stop reason: HOSPADM

## 2023-11-25 RX ORDER — ONDANSETRON 8 MG/1
8 TABLET, ORALLY DISINTEGRATING ORAL EVERY 8 HOURS PRN
Status: DISCONTINUED | OUTPATIENT
Start: 2023-11-25 | End: 2023-11-25

## 2023-11-25 RX ORDER — SODIUM CHLORIDE, SODIUM LACTATE, POTASSIUM CHLORIDE, CALCIUM CHLORIDE 600; 310; 30; 20 MG/100ML; MG/100ML; MG/100ML; MG/100ML
INJECTION, SOLUTION INTRAVENOUS CONTINUOUS
Status: DISCONTINUED | OUTPATIENT
Start: 2023-11-25 | End: 2023-11-25

## 2023-11-25 RX ORDER — DIPHENOXYLATE HYDROCHLORIDE AND ATROPINE SULFATE 2.5; .025 MG/1; MG/1
2 TABLET ORAL EVERY 6 HOURS PRN
Status: DISCONTINUED | OUTPATIENT
Start: 2023-11-25 | End: 2023-11-27 | Stop reason: HOSPADM

## 2023-11-25 RX ORDER — CALCIUM CARBONATE 200(500)MG
500 TABLET,CHEWABLE ORAL 3 TIMES DAILY PRN
Status: DISCONTINUED | OUTPATIENT
Start: 2023-11-25 | End: 2023-11-25

## 2023-11-25 RX ORDER — ROPIVACAINE HYDROCHLORIDE 2 MG/ML
INJECTION, SOLUTION EPIDURAL; INFILTRATION
Status: COMPLETED | OUTPATIENT
Start: 2023-11-25 | End: 2023-11-25

## 2023-11-25 RX ORDER — LIDOCAINE HYDROCHLORIDE 10 MG/ML
10 INJECTION, SOLUTION EPIDURAL; INFILTRATION; INTRACAUDAL; PERINEURAL ONCE AS NEEDED
Status: DISCONTINUED | OUTPATIENT
Start: 2023-11-25 | End: 2023-11-25

## 2023-11-25 RX ADMIN — ROPIVACAINE HYDROCHLORIDE 8 ML: 2 INJECTION, SOLUTION EPIDURAL; INFILTRATION at 01:11

## 2023-11-25 RX ADMIN — EPHEDRINE SULFATE 25 MG: 50 INJECTION INTRAVENOUS at 03:11

## 2023-11-25 RX ADMIN — DEXTROSE MONOHYDRATE 3 MILLION UNITS: 50 INJECTION, SOLUTION INTRAVENOUS at 04:11

## 2023-11-25 RX ADMIN — SODIUM CHLORIDE, POTASSIUM CHLORIDE, SODIUM LACTATE AND CALCIUM CHLORIDE: 600; 310; 30; 20 INJECTION, SOLUTION INTRAVENOUS at 07:11

## 2023-11-25 RX ADMIN — BUPIVACAINE HYDROCHLORIDE 5 ML: 2.5 INJECTION, SOLUTION EPIDURAL; INFILTRATION; INTRACAUDAL; PERINEURAL at 02:11

## 2023-11-25 RX ADMIN — SODIUM CHLORIDE, POTASSIUM CHLORIDE, SODIUM LACTATE AND CALCIUM CHLORIDE: 600; 310; 30; 20 INJECTION, SOLUTION INTRAVENOUS at 03:11

## 2023-11-25 RX ADMIN — PENICILLIN G POTASSIUM 5 MILLION UNITS: 5000000 INJECTION, POWDER, FOR SOLUTION INTRAMUSCULAR; INTRAVENOUS at 12:11

## 2023-11-25 RX ADMIN — SODIUM CHLORIDE, POTASSIUM CHLORIDE, SODIUM LACTATE AND CALCIUM CHLORIDE 1000 ML: 600; 310; 30; 20 INJECTION, SOLUTION INTRAVENOUS at 12:11

## 2023-11-25 RX ADMIN — EPHEDRINE SULFATE 25 MG: 50 INJECTION, SOLUTION INTRAVENOUS at 03:11

## 2023-11-25 RX ADMIN — Medication 2 MILLI-UNITS/MIN: at 07:11

## 2023-11-25 RX ADMIN — ROPIVACAINE HYDROCHLORIDE 12 ML/HR: 2 INJECTION, SOLUTION EPIDURAL; INFILTRATION at 01:11

## 2023-11-25 NOTE — HOSPITAL COURSE
37w0d IOL IUGR  Admit  Pitocin titrate for adequate labor pattern  CMFM  ASVD    23 @ 0715:  Will eat breakfast   Pitocin 2x2 and will place gaston balloon   Epidural per patient request  Anticipate      23 PPD #1 desires discharge when baby gets discharged.  Continue with routine PP care    23- PPD2 patient to be discharged home with infant today, continue with routine pp care.

## 2023-11-25 NOTE — SUBJECTIVE & OBJECTIVE
Interval History:  Brianna is a 38 y.o.  at 37w0d. She is doing well.     Objective:     Vital Signs (Most Recent):  Temp: 98 °F (36.7 °C) (23 0540)  Pulse: 77 (23 0620)  Resp: 18 (23 0550)  BP: 133/71 (23 0620)  SpO2: 97 % (23 0620) Vital Signs (24h Range):  Temp:  [98 °F (36.7 °C)] 98 °F (36.7 °C)  Pulse:  [77-86] 77  Resp:  [18] 18  SpO2:  [97 %-98 %] 97 %  BP: (128-136)/(71-75) 133/71        There is no height or weight on file to calculate BMI.    FHT: 135Cat 1 (reassuring)  TOCO:  few    Cervical Exam:  Dilation:  2  Effacement:  70  Station: -2  Presentation: Vertex     Significant Labs:  Lab Results   Component Value Date    GROUPTRH B POS 2023    HEPBSAG Non-reactive 2023    STREPBCULT Normal cervicovaginal luz elena present 2023       I have personallly reviewed all pertinent lab results from the last 24 hours.    Physical Exam:   Constitutional: She is oriented to person, place, and time. She appears well-developed and well-nourished.       Cardiovascular:  Normal rate.             Pulmonary/Chest: Effort normal. No respiratory distress.        Abdominal: Soft.     Genitourinary:    Vagina and uterus normal.             Musculoskeletal: Moves all extremeties.       Neurological: She is alert and oriented to person, place, and time.    Skin: Skin is warm and dry.    Psychiatric: She has a normal mood and affect.       Review of Systems

## 2023-11-25 NOTE — ASSESSMENT & PLAN NOTE
- Admit to  for labor at term   - CMFM  - Clear Liquid/IVF/Progressive Mobility  - Vitals per  routine  - LR @ 125 mL/hr  - SCDs  - CBC, Type and Screen  - GBS neg      - Anesthesia to evaluate for possible epidural   - Pit augmentation   - AROM if appropriate/necessary   - ASVD    23 @ 0715:  Will eat breakfast   Pitocin 2x2 and will place gaston balloon   Epidural per patient request  Anticipate

## 2023-11-25 NOTE — PROGRESS NOTES
[facility] - [facility]  Obstetrics  Labor Progress Note    Patient Name: Brianna Aguirre  MRN: [id number]  Admission Date: 2023  Hospital Length of Stay: 0 days  Attending Physician: Viktor Garcia MD  Primary Care Provider: Mary Frias NP    Subjective:     Principal Problem:Encounter for induction of labor    Hospital Course:   37w0d IOL IUGR  Admit  Pitocin titrate for adequate labor pattern  CMFM  ASVD    23 @ 0715:  Will eat breakfast   Pitocin 2x2 and will place gaston balloon   Epidural per patient request  Anticipate      Interval History:  Brianna is a 38 y.o.  at 37w0d. She is doing well.     Objective:     Vital Signs (Most Recent):  Temp: 98 °F (36.7 °C) (23 0540)  Pulse: 77 (23 0620)  Resp: 18 (23 0550)  BP: 133/71 (23 0620)  SpO2: 97 % (23 0620) Vital Signs (24h Range):  Temp:  [98 °F (36.7 °C)] 98 °F (36.7 °C)  Pulse:  [77-86] 77  Resp:  [18] 18  SpO2:  [97 %-98 %] 97 %  BP: (128-136)/(71-75) 133/71        There is no height or weight on file to calculate BMI.    FHT: 135Cat 1 (reassuring)  TOCO:  few    Cervical Exam:  Dilation:  2  Effacement:  70  Station: -2  Presentation: Vertex     Significant Labs:  Lab Results   Component Value Date    GROUPTRH B POS 2023    HEPBSAG Non-reactive 2023    STREPBCULT Normal cervicovaginal luz elena present 2023       I have personallly reviewed all pertinent lab results from the last 24 hours.    Physical Exam:   Constitutional: She is oriented to person, place, and time. She appears well-developed and well-nourished.       Cardiovascular:  Normal rate.             Pulmonary/Chest: Effort normal. No respiratory distress.        Abdominal: Soft.     Genitourinary:    Vagina and uterus normal.             Musculoskeletal: Moves all extremeties.       Neurological: She is alert and oriented to person, place, and time.    Skin: Skin is warm and dry.    Psychiatric: She has a normal  mood and affect.       Review of Systems  Assessment/Plan:     38 y.o. female  at 37w0d for:    * Encounter for induction of labor  - Admit to  for labor at term   - CMFM  - Clear Liquid/IVF/Progressive Mobility  - Vitals per  routine  - LR @ 125 mL/hr  - SCDs  - CBC, Type and Screen  - GBS neg      - Anesthesia to evaluate for possible epidural   - Pit augmentation   - AROM if appropriate/necessary   - ASVD    23 @ 0715:  Will eat breakfast   Pitocin 2x2 and will place gaston balloon   Epidural per patient request  Anticipate      Fetal growth restriction antepartum  Planned IOL @ 37 wk  EFW 7%, AC 2%    Anemia affecting pregnancy in third trimester  Admit H/H 10.7/31.4    Abnormal O'Turner glucose challenge test, antepartum  Admit blood sugar 92    AMA (advanced maternal age) multigravida 35+  .          Lexi Espinoza CNM  Obstetrics  [facility]

## 2023-11-25 NOTE — PLAN OF CARE
Discussed practices that support optimal maternity care and  feeding such as immediate skin to skin, the magic first hour, the importance of the first feeding, and delaying routine procedures. Also discussed continued skin to skin contact, rooming-in, and feeding on cue. Discussed feeding choice with mother. Reviewed benefits of breastfeeding and risks of formula feeding. Mother states her intention is to formula feed.     BUFA planned. Mother would like to do skin to skin and room in with baby until discharge.

## 2023-11-25 NOTE — ANESTHESIA PREPROCEDURE EVALUATION
11/25/2023  Brianna Aguirre is a 38 y.o., female.      Pre-op Assessment    I have reviewed the Patient Summary Reports.     I have reviewed the Nursing Notes.    I have reviewed the Medications.     Review of Systems  Anesthesia Hx:  No problems with previous Anesthesia   History of prior surgery of interest to airway management or planning:  Previous anesthesia: Epidural        Denies Family Hx of Anesthesia complications.    Denies Personal Hx of Anesthesia complications.                    Social:  Non-Smoker, No Alcohol Use       Hematology/Oncology:  Hematology Normal   Oncology Normal                                   EENT/Dental:  EENT/Dental Normal           Cardiovascular:  Cardiovascular Normal Exercise tolerance: good   Denies Pacemaker.  Denies Hypertension.             NYHA Classification I ECG has been reviewed.                          Pulmonary:  Pulmonary Normal                       Renal/:  Renal/ Normal                 Hepatic/GI:  Hepatic/GI Normal                 Musculoskeletal:  Musculoskeletal Normal                Neurological:  Neurology Normal                                      Endocrine:  Endocrine Normal            Psych:  Psychiatric History anxiety                 Physical Exam  General: Well nourished, Cooperative, Alert and Oriented    Airway:  Mallampati: II   Mouth Opening: Normal  TM Distance: Normal  Tongue: Normal  Neck ROM: Normal ROM    Dental:  Intact        Anesthesia Plan  Type of Anesthesia, risks & benefits discussed:    Anesthesia Type: Epidural  Intra-op Monitoring Plan: Standard ASA Monitors  Post Op Pain Control Plan: epidural analgesia  Informed Consent: Informed consent signed with the Patient and all parties understand the risks and agree with anesthesia plan.  All questions answered.   ASA Score: 2  Day of Surgery Review of History & Physical: I  have interviewed and examined the patient. I have reviewed the patient's H&P dated:     Ready For Surgery From Anesthesia Perspective.     .

## 2023-11-25 NOTE — PROGRESS NOTES
S: getting epidural reinjection  O:  VS reviewed, afebrile   Vitals:    11/25/23 1300 11/25/23 1305 11/25/23 1315 11/25/23 1320   BP: (!) 147/81 (!) 140/73 (!) 144/84 139/84   Pulse: 73 69 74 78   Resp: 18  18    Temp: 98.4 °F (36.9 °C)  98.1 °F (36.7 °C)    TempSrc: Oral  Oral    SpO2: 99% 100% 100% 100%       FHTs 130 moderate variability; category 1  UC 2-4 min  SVE gaston balloon in place     A: IUP @ 37w0d ;     Patient Active Problem List   Diagnosis    AMA (advanced maternal age) multigravida 35+    BMI 38.0-38.9,adult    ADHD    Anxiety and depression    Abnormal O'Turner glucose challenge test, antepartum    Anemia affecting pregnancy in third trimester    Refuses tetanus, diphtheria, and acellular pertussis (Tdap) vaccination    Fetal growth restriction antepartum    Encounter for induction of labor       P:   Continue close monitoring of maternal/fetal status  Continue POC  Start treatment for GBS that resulted at 1000 today

## 2023-11-25 NOTE — ANESTHESIA PROCEDURE NOTES
Epidural    Patient location during procedure: OB   Reason for block: primary anesthetic   Reason for block: labor analgesia requested by patient and obstetrician  Diagnosis: IUP, Labor Pain   Start time: 11/25/2023 12:55 PM  Timeout: 11/25/2023 12:55 PM  End time: 11/25/2023 1:09 PM    Staffing  Performing Provider: Trevon Allred CRNA  Authorizing Provider: Dillon Bearden II, MD    Staffing  Performed by: Trevon Allred CRNA  Authorized by: Dillon Bearden II, MD        Preanesthetic Checklist  Completed: patient identified, IV checked, site marked, risks and benefits discussed, monitors and equipment checked, pre-op evaluation, timeout performed, anesthesia consent given, hand hygiene performed and patient being monitored  Preparation  Patient position: sitting  Prep: ChloraPrep  Patient monitoring: Pulse Ox and Blood Pressure  Reason for block: primary anesthetic   Epidural  Skin Anesthetic: lidocaine 1%  Skin Wheal: 3 mL  Administration type: continuous  Approach: midline  Interspace: L4-5    Injection technique: JENNIFER air  Needle and Epidural Catheter  Needle type: Tuohy   Needle gauge: 17  Needle length: 3.5 inches  Needle insertion depth: 6.5 cm  Catheter type: springwound and multi-orifice  Catheter size: 19 G  Catheter at skin depth: 12 cm  Insertion Attempts: 1  Test dose: 3 mL of lidocaine 1.5% with Epi 1-to-200,000  Additional Documentation: incremental injection, no paresthesia on injection, negative aspiration for heme and CSF, no significant pain on injection, no signs/symptoms of IV or SA injection and no significant complaints from patient  Needle localization: anatomical landmarks  Assessment  Upper dermatomal levels - Left: T10  Right: T10   Dermatomal levels determined by alcohol wipe  Ease of block: easy  Patient's tolerance of the procedure: comfortable throughout block and no complaints No inadvertent dural puncture with Tuohy.  Dural puncture not performed with spinal  needle    Medications:    Medications: ropivacaine (NAROPIN) solution 0.2% - Epidural   8 mL - 11/25/2023 1:01:00 PM

## 2023-11-25 NOTE — ANESTHESIA PROCEDURE NOTES
Epidural    Patient location during procedure: OB   Reason for block: primary anesthetic   Reason for block: labor analgesia requested by patient and obstetrician  Diagnosis: IUP, Labor Pain   Start time: 11/25/2023 2:08 PM  Timeout: 11/25/2023 2:08 PM  End time: 11/25/2023 2:18 PM    Staffing  Performing Provider: Trevon Allred CRNA  Authorizing Provider: Dillon Bearden II, MD    Staffing  Performed by: Trevon Allred CRNA  Authorized by: Dillon Bearden II, MD        Preanesthetic Checklist  Completed: patient identified, IV checked, site marked, risks and benefits discussed, monitors and equipment checked, pre-op evaluation, timeout performed, anesthesia consent given, hand hygiene performed and patient being monitored  Preparation  Patient position: sitting  Prep: ChloraPrep  Patient monitoring: Pulse Ox and Blood Pressure  Reason for block: primary anesthetic   Epidural  Skin Anesthetic: lidocaine 1%  Skin Wheal: 3 mL  Administration type: continuous  Approach: midline  Interspace: L4-5    Injection technique: JENNIFER air  Needle and Epidural Catheter  Needle type: Tuohy   Needle gauge: 17  Needle length: 3.5 inches  Needle insertion depth: 8 cm  Catheter type: springwound and multi-orifice  Catheter size: 19 G  Catheter at skin depth: 14 cm  Insertion Attempts: 1  Test dose: 3 mL of lidocaine 1.5% with Epi 1-to-200,000  Additional Documentation: incremental injection, no paresthesia on injection, negative aspiration for heme and CSF, no significant pain on injection, no signs/symptoms of IV or SA injection and no significant complaints from patient  Needle localization: anatomical landmarks  Assessment  Upper dermatomal levels - Left: T10  Right: T10   Dermatomal levels determined by alcohol wipe  Ease of block: easy  Patient's tolerance of the procedure: comfortable throughout block and no complaints  Additional Notes  First epidural replaced due to inadequate pain relief. Pt tolerated well. Epidural  tip intact upon removal. No inadvertent dural puncture with Tuohy.  Dural puncture not performed with spinal needle    Medications:    Medications: bupivacaine (pf) (MARCAINE) injection 0.25% - Epidural   5 mL - 11/25/2023 2:12:00 PM

## 2023-11-25 NOTE — H&P
[facility]  Obstetrics  History & Physical    Patient Name: Brianna Aguirre  MRN: [id number]  Admission Date: 2023  Primary Care Provider: Mary Frias NP    Subjective:     Principal Problem:Encounter for induction of labor    History of Present Illness:  Brianna Aguirre is a 38 y.o. female  37w0d from home for IOL for IUGR    Obstetric HPI:  Patient reports None contractions, active fetal movement, No vaginal bleeding , No loss of fluid     This pregnancy has been complicated by AMA, obesity, anxiety and depression, anemia, and IUGR    OB History    Para Term  AB Living   4 3 3 0 0 3   SAB IAB Ectopic Multiple Live Births   0 0 0 0 3      # Outcome Date GA Lbr Ed/2nd Weight Sex Delivery Anes PTL Lv   4 Current            3 Term 09 39w0d   M Vag-Spont EPI  JAIRO   2 Term 07 38w0d   M Vag-Spont EPI  JAIRO   1 Term 10/23/06 38w0d   F Vag-Spont EPI  JAIRO     Past Medical History:   Diagnosis Date    Mental disorder     ADHD    Mild anemia 2023     History reviewed. No pertinent surgical history.    PTA Medications   Medication Sig    aspirin 81 MG Chew Take 1 tablet (81 mg total) by mouth once daily.    buPROPion (WELLBUTRIN XL) 150 MG TB24 tablet Take 150 mg by mouth every morning.    ferrous sulfate (FEOSOL) 325 mg (65 mg iron) Tab tablet Take 1 tablet (325 mg total) by mouth 2 (two) times daily.    FLUoxetine 40 MG capsule Take 40 mg by mouth every morning.       Review of patient's allergies indicates:  No Known Allergies     Family History    None       Tobacco Use    Smoking status: Never     Passive exposure: Never    Smokeless tobacco: Never   Substance and Sexual Activity    Alcohol use: Never    Drug use: Never    Sexual activity: Yes     Partners: Male     Review of Systems   Constitutional: Negative.    HENT: Negative.     Eyes: Negative.    Respiratory: Negative.     Cardiovascular: Negative.    Gastrointestinal: Negative.    Endocrine: Negative.     Genitourinary: Negative.    Musculoskeletal: Negative.    Integumentary:  Negative.   Neurological: Negative.    Hematological: Negative.    Psychiatric/Behavioral: Negative.     Breast: negative.       Objective:     Vital Signs (Most Recent):    Vital Signs (24h Range):           There is no height or weight on file to calculate BMI.    FHT: 135Cat 1 (reassuring)  TOCO:  Q 0 minutes     Physical Exam:   Constitutional: She is oriented to person, place, and time. She appears well-developed and well-nourished.    HENT:   Head: Normocephalic and atraumatic.      Cardiovascular:  Normal rate.             Pulmonary/Chest: Effort normal.        Abdominal: Soft.   gravid             Musculoskeletal: Normal range of motion and moves all extremeties.       Neurological: She is alert and oriented to person, place, and time. She has normal reflexes.    Skin: Skin is warm and dry.    Psychiatric: She has a normal mood and affect. Her behavior is normal. Judgment and thought content normal.        Cervix:  Dilation:  2.5 per RN  Effacement:  50%  Station: -3  Presentation: Vertex     Significant Labs:  Lab Results   Component Value Date    GROUPTRH B POS 2023    HEPBSAG Non-reactive 2023    STREPBCULT Normal cervicovaginal luz elena present 2023       I have personallly reviewed all pertinent lab results from the last 24 hours.  Assessment/Plan:     38 y.o. female  at 37w0d for:    * Encounter for induction of labor  - Admit to  for labor at term   - CMFM  - Clear Liquid/IVF/Progressive Mobility  - Vitals per  routine  - LR @ 125 mL/hr  - SCDs  - CBC, Type and Screen  - GBS neg      - Anesthesia to evaluate for possible epidural   - Pit augmentation   - AROM if appropriate/necessary   - ASVD      Fetal growth restriction antepartum  Planned IOL @ 37 wk              Jayna Chapman CNM  Obstetrics  [facility]

## 2023-11-25 NOTE — PROGRESS NOTES
S: resting in bed  O:  VS reviewed, afebrile   Vitals:    23 0620 23 0700 23 0730 23 0800   BP: 133/71 114/61 122/64 (!) 113/58   Pulse: 77 90 75 79   Resp:    16   Temp:       TempSrc:       SpO2: 97% 99% 98% 100%       FHTs 155 moderate variability, intermittent decels noted. Resolved with position change and fluid bolus  UC 2-3 min  SVE 2/80/-2, gaston balloon placed    A: IUP @ 37w0d ;     Patient Active Problem List   Diagnosis    AMA (advanced maternal age) multigravida 35+    BMI 38.0-38.9,adult    ADHD    Anxiety and depression    Abnormal O'Turner glucose challenge test, antepartum    Anemia affecting pregnancy in third trimester    Refuses tetanus, diphtheria, and acellular pertussis (Tdap) vaccination    Fetal growth restriction antepartum    Encounter for induction of labor       P:   Continue close monitoring of maternal/fetal status  Continue POC  Treat GBS  Epidural per patient request  Anticipate

## 2023-11-25 NOTE — SUBJECTIVE & OBJECTIVE
Obstetric HPI:  Patient reports None contractions, active fetal movement, No vaginal bleeding , No loss of fluid     This pregnancy has been complicated by AMA, obesity, anxiety and depression, anemia, and IUGR    OB History    Para Term  AB Living   4 3 3 0 0 3   SAB IAB Ectopic Multiple Live Births   0 0 0 0 3      # Outcome Date GA Lbr Ed/2nd Weight Sex Delivery Anes PTL Lv   4 Current            3 Term 09 39w0d   M Vag-Spont EPI  JAIRO   2 Term 07 38w0d   M Vag-Spont EPI  JAIRO   1 Term 10/23/06 38w0d   F Vag-Spont EPI  JAIRO     Past Medical History:   Diagnosis Date    Mental disorder     ADHD    Mild anemia 2023     History reviewed. No pertinent surgical history.    PTA Medications   Medication Sig    aspirin 81 MG Chew Take 1 tablet (81 mg total) by mouth once daily.    buPROPion (WELLBUTRIN XL) 150 MG TB24 tablet Take 150 mg by mouth every morning.    ferrous sulfate (FEOSOL) 325 mg (65 mg iron) Tab tablet Take 1 tablet (325 mg total) by mouth 2 (two) times daily.    FLUoxetine 40 MG capsule Take 40 mg by mouth every morning.       Review of patient's allergies indicates:  No Known Allergies     Family History    None       Tobacco Use    Smoking status: Never     Passive exposure: Never    Smokeless tobacco: Never   Substance and Sexual Activity    Alcohol use: Never    Drug use: Never    Sexual activity: Yes     Partners: Male     Review of Systems   Constitutional: Negative.    HENT: Negative.     Eyes: Negative.    Respiratory: Negative.     Cardiovascular: Negative.    Gastrointestinal: Negative.    Endocrine: Negative.    Genitourinary: Negative.    Musculoskeletal: Negative.    Integumentary:  Negative.   Neurological: Negative.    Hematological: Negative.    Psychiatric/Behavioral: Negative.     Breast: negative.       Objective:     Vital Signs (Most Recent):    Vital Signs (24h Range):           There is no height or weight on file to calculate BMI.    FHT: 135Cat 1  (reassuring)  TOCO:  Q 0 minutes     Physical Exam:   Constitutional: She is oriented to person, place, and time. She appears well-developed and well-nourished.    HENT:   Head: Normocephalic and atraumatic.      Cardiovascular:  Normal rate.             Pulmonary/Chest: Effort normal.        Abdominal: Soft.   gravid             Musculoskeletal: Normal range of motion and moves all extremeties.       Neurological: She is alert and oriented to person, place, and time. She has normal reflexes.    Skin: Skin is warm and dry.    Psychiatric: She has a normal mood and affect. Her behavior is normal. Judgment and thought content normal.        Cervix:  Dilation:  2.5 per RN  Effacement:  50%  Station: -3  Presentation: Vertex     Significant Labs:  Lab Results   Component Value Date    GROUPTRH B POS 07/26/2023    HEPBSAG Non-reactive 07/26/2023    STREPBCULT Normal cervicovaginal luz elena present 11/21/2023       I have personallly reviewed all pertinent lab results from the last 24 hours.

## 2023-11-25 NOTE — ASSESSMENT & PLAN NOTE
- Admit to  for labor at term   - CMFM  - Clear Liquid/IVF/Progressive Mobility  - Vitals per [facility] routine  - LR @ 125 mL/hr  - SCDs  - CBC, Type and Screen  - GBS neg      - Anesthesia to evaluate for possible epidural   - Pit augmentation   - AROM if appropriate/necessary   - ASVD

## 2023-11-25 NOTE — PROGRESS NOTES
S: comfortable with epidural  O:  VS reviewed, afebrile   Vitals:    23 1520 23 1521 23 1531 23 1533   BP:  (!) 88/39 (!) 88/49 (!) 91/44   Pulse: 87 101 (!) 114 (!) 117   Resp:    16   Temp:       TempSrc:       SpO2: 98%          FHTs 135 moderate variability, category 1  UC  2-4 min  SVE 5/80/-2, SROM clear    A: IUP @ 37w0d ;     Patient Active Problem List   Diagnosis    AMA (advanced maternal age) multigravida 35+    BMI 38.0-38.9,adult    ADHD    Anxiety and depression    Abnormal O'Turner glucose challenge test, antepartum    Anemia affecting pregnancy in third trimester    Refuses tetanus, diphtheria, and acellular pertussis (Tdap) vaccination    Fetal growth restriction antepartum    Encounter for induction of labor       P:   Continue close monitoring of maternal/fetal status  Continue POC  Min catheter placed  Anticipate

## 2023-11-26 PROCEDURE — 11000001 HC ACUTE MED/SURG PRIVATE ROOM

## 2023-11-26 PROCEDURE — 99231 SBSQ HOSP IP/OBS SF/LOW 25: CPT | Mod: ,,, | Performed by: ADVANCED PRACTICE MIDWIFE

## 2023-11-26 PROCEDURE — 99231 PR SUBSEQUENT HOSPITAL CARE,LEVL I: ICD-10-PCS | Mod: ,,, | Performed by: ADVANCED PRACTICE MIDWIFE

## 2023-11-26 PROCEDURE — 25000003 PHARM REV CODE 250

## 2023-11-26 RX ADMIN — IBUPROFEN 600 MG: 600 TABLET ORAL at 12:11

## 2023-11-26 RX ADMIN — PRENATAL VITAMINS-IRON FUMARATE 27 MG IRON-FOLIC ACID 0.8 MG TABLET 1 TABLET: at 09:11

## 2023-11-26 RX ADMIN — IBUPROFEN 600 MG: 600 TABLET ORAL at 05:11

## 2023-11-26 RX ADMIN — IBUPROFEN 600 MG: 600 TABLET ORAL at 11:11

## 2023-11-26 NOTE — SUBJECTIVE & OBJECTIVE
Interval History:     She is doing well this morning. She is tolerating a regular diet without nausea or vomiting. She is voiding spontaneously. She is ambulating. She has passed flatus, and has not a BM. Vaginal bleeding is mild. She denies fever or chills. Abdominal pain is mild and controlled with oral medications. She Is not breastfeeding. She desires circumcision for her male baby: not applicable.    Objective:     Vital Signs (Most Recent):  Temp: 97.8 °F (36.6 °C) (11/26/23 0736)  Pulse: 80 (11/26/23 0736)  Resp: 18 (11/26/23 0736)  BP: 116/66 (11/26/23 0736)  SpO2: 96 % (11/26/23 0736) Vital Signs (24h Range):  Temp:  [97.7 °F (36.5 °C)-98.5 °F (36.9 °C)] 97.8 °F (36.6 °C)  Pulse:  [] 80  Resp:  [16-20] 18  SpO2:  [96 %-100 %] 96 %  BP: ()/(35-93) 116/66        There is no height or weight on file to calculate BMI.      Intake/Output Summary (Last 24 hours) at 11/26/2023 0835  Last data filed at 11/26/2023 0351  Gross per 24 hour   Intake 56.73 ml   Output 1360 ml   Net -1303.27 ml         Significant Labs:  Lab Results   Component Value Date    GROUPTRH B POS 11/25/2023    HEPBSAG Non-reactive 07/26/2023    STREPBCULT (A) 11/21/2023     STREPTOCOCCUS AGALACTIAE (GROUP B)  In case of Penicillin allergy, call lab for further testing.  Beta-hemolytic streptococci are routinely susceptible to   penicillins,cephalosporins and carbapenems.       Recent Labs   Lab 11/25/23  0544   HGB 10.7*   HCT 31.4*       I have personallly reviewed all pertinent lab results from the last 24 hours.    Physical Exam:   Constitutional: She is oriented to person, place, and time. She appears well-developed and well-nourished.        Pulmonary/Chest: Effort normal.          Genitourinary:    Uterus normal.             Musculoskeletal: Normal range of motion and moves all extremeties.       Neurological: She is alert and oriented to person, place, and time.    Skin: Skin is warm and dry.    Psychiatric: She has a normal  mood and affect. Her behavior is normal. Judgment and thought content normal.       Review of Systems

## 2023-11-26 NOTE — PLAN OF CARE
POC reviewed with patient. Fundus firm without massage and below umbilicus. Bleeding light, no clots passed this shift. Voids spontaneously. Ambulates independently. Pain well controlled with oral pain medication. Vital signs stable at this time. Bonding well with infant; responds to infant cues and participates in infant care. Call light placed within patient reach, encouraged use if needed.

## 2023-11-26 NOTE — CONSULTS
"SW contacted patient to complete assessment/consult for planned adoption. Patient reports that she is in process of planned adoption but "it's not official yet." Patient states she's waiting to speak with baby's father before moving forward. Patient requested call back d/t baby receiving hearing test. SW unable to complete assessment. SW to follow up.     1122AM: SW attempted to call back, no answer. VM left requesting follow up.   "

## 2023-11-26 NOTE — PROGRESS NOTES
[facility] ([facility])  Obstetrics  Postpartum Progress Note    Patient Name: Brianna Aguirre  MRN: [id number]  Admission Date: 2023  Hospital Length of Stay: 1 days  Attending Physician: Viktor Garcia MD  Primary Care Provider: Mary Frias NP    Subjective:     Principal Problem: (spontaneous vaginal delivery)    Hospital Course:   37w0d IOL IUGR  Admit  Pitocin titrate for adequate labor pattern  CMFM  ASVD    23 @ 0715:  Will eat breakfast   Pitocin 2x2 and will place gaston balloon   Epidural per patient request  Anticipate      23 PPD #1 desires discharge when baby gets discharged.  Continue with routine PP care    Interval History:     She is doing well this morning. She is tolerating a regular diet without nausea or vomiting. She is voiding spontaneously. She is ambulating. She has passed flatus, and has not a BM. Vaginal bleeding is mild. She denies fever or chills. Abdominal pain is mild and controlled with oral medications. She Is not breastfeeding. She desires circumcision for her male baby: not applicable.    Objective:     Vital Signs (Most Recent):  Temp: 97.8 °F (36.6 °C) (23)  Pulse: 80 (23)  Resp: 18 (23)  BP: 116/66 (23)  SpO2: 96 % (23) Vital Signs (24h Range):  Temp:  [97.7 °F (36.5 °C)-98.5 °F (36.9 °C)] 97.8 °F (36.6 °C)  Pulse:  [] 80  Resp:  [16-20] 18  SpO2:  [96 %-100 %] 96 %  BP: ()/(35-93) 116/66        There is no height or weight on file to calculate BMI.      Intake/Output Summary (Last 24 hours) at 2023 0835  Last data filed at 2023 0351  Gross per 24 hour   Intake 56.73 ml   Output 1360 ml   Net -1303.27 ml         Significant Labs:  Lab Results   Component Value Date    GROUPTRH B POS 2023    HEPBSAG Non-reactive 2023    STREPBCULT (A) 2023     STREPTOCOCCUS AGALACTIAE (GROUP B)  In case of Penicillin allergy, call lab for further  testing.  Beta-hemolytic streptococci are routinely susceptible to   penicillins,cephalosporins and carbapenems.       Recent Labs   Lab 23  0544   HGB 10.7*   HCT 31.4*       I have personallly reviewed all pertinent lab results from the last 24 hours.    Physical Exam:   Constitutional: She is oriented to person, place, and time. She appears well-developed and well-nourished.        Pulmonary/Chest: Effort normal.          Genitourinary:    Uterus normal.             Musculoskeletal: Normal range of motion and moves all extremeties.       Neurological: She is alert and oriented to person, place, and time.    Skin: Skin is warm and dry.    Psychiatric: She has a normal mood and affect. Her behavior is normal. Judgment and thought content normal.       Review of Systems  Assessment/Plan:     38 y.o. female  for:    Fetal growth restriction antepartum  Planned IOL @ 37 wk  EFW 7%, AC 2%    Anemia affecting pregnancy in third trimester  Admit H/H 10.7/31.4    Abnormal O'Turner glucose challenge test, antepartum  Admit blood sugar 92    AMA (advanced maternal age) multigravida 35+  .        Disposition: As patient meets milestones, will plan to discharge 23.    Kaylynn Pruett CNM  Obstetrics  [facility] ([facility])

## 2023-11-26 NOTE — PROGRESS NOTES
S: comfortable with epidural  O:  VS reviewed, afebrile   Vitals:    23 1616 23 1631 23 1701 23 1746   BP: 113/69 118/69 122/69 111/60   Pulse: 98 101 90 84   Resp: 16      Temp:       TempSrc:       SpO2:           FHTs  155 moderate variability, decels noted but overall reassuring   UC 4-5 min, IUPC in place  SVE 6-7/80/-1    A: IUP @ 37w0d ;     Patient Active Problem List   Diagnosis    AMA (advanced maternal age) multigravida 35+    BMI 38.0-38.9,adult    ADHD    Anxiety and depression    Abnormal O'Turner glucose challenge test, antepartum    Anemia affecting pregnancy in third trimester    Refuses tetanus, diphtheria, and acellular pertussis (Tdap) vaccination    Fetal growth restriction antepartum    Encounter for induction of labor       P:   Continue close monitoring of maternal/fetal status  Decels resolved with repositioning. Prolong noted after epidural redone.   Unable to determine other decels due to inability to trace contractions on monitor. IUPC placed   Continue POC  Anticipate

## 2023-11-26 NOTE — PROGRESS NOTES
11/25/23 1914   TeleMary Hartman Note - Strip   Strip Reviewed by Devan Nurse? Yes   TeleStork Devan Note - Communication   [place] Nurse Communicated with Bedside Nurse Regarding: Fetal Status   TeleStrafael Hartman Note - Notification   Nurse Notified?   (nurse in room)

## 2023-11-26 NOTE — PROGRESS NOTES
S: comfortable with epidural  O:  VS reviewed, afebrile   Vitals:    11/25/23 1755 11/25/23 1800 11/25/23 1801 11/25/23 1830   BP:   (!) 109/45 (!) 108/55   Pulse: 79 83 86 81   Resp:   18 18   Temp:    97.7 °F (36.5 °C)   TempSrc:       SpO2: 99% 100%  100%       FHTs 155 moderate variability, variable decels noted. Occurred after repositioning patient. Patient repositioned again  UC 3-4 min  SVE 7-8/80/0, head feels asynclitic     A: IUP @ 37w0d ;     Patient Active Problem List   Diagnosis    AMA (advanced maternal age) multigravida 35+    BMI 38.0-38.9,adult    ADHD    Anxiety and depression    Abnormal O'Turner glucose challenge test, antepartum    Anemia affecting pregnancy in third trimester    Refuses tetanus, diphtheria, and acellular pertussis (Tdap) vaccination    Fetal growth restriction antepartum    Encounter for induction of labor       P:   Continue close monitoring of maternal/fetal status  Patient repositioned in exaggerated gonzalez.   Reviewed strip with MD  Continue POC

## 2023-11-26 NOTE — ANESTHESIA POSTPROCEDURE EVALUATION
Anesthesia Post Evaluation    Patient: rBianna Aguirre    Procedure(s) Performed: * No procedures listed *    Final Anesthesia Type: epidural      Patient location during evaluation: labor & delivery  Patient participation: Yes- Able to Participate  Level of consciousness: awake and alert and oriented  Post-procedure vital signs: reviewed and stable  Pain management: adequate  Airway patency: patent  JAYLAN mitigation strategies: Use of major conduction anesthesia (spinal/epidural) or peripheral nerve block  PONV status at discharge: No PONV  Anesthetic complications: no      Cardiovascular status: hemodynamically stable  Respiratory status: spontaneous ventilation and room air  Hydration status: euvolemic  Follow-up not needed.          Vitals Value Taken Time   /64 11/26/23 0030   Temp 36.9 °C (98.5 °F) 11/26/23 0030   Pulse 101 11/26/23 0030   Resp 18 11/26/23 0030   SpO2 99 % 11/26/23 0030         No case tracking events are documented in the log.      Pain/Johnny Score: Pain Rating Prior to Med Admin: 2 (11/26/2023 12:36 AM)

## 2023-11-26 NOTE — L&D DELIVERY NOTE
[facility] - Labor & Delivery  Vaginal Delivery   Operative Note    SUMMARY     Normal spontaneous vaginal delivery of live infant, was placed on mothers abdomen for skin to skin and bulb suctioning performed.  Infant delivered position OA over intact perineum.  Nuchal cord: No.    Spontaneous delivery of placenta and IV pitocin given noting. Will send placenta to pathology for IUGR.  good uterine tone.  No lacerations noted.  Patient tolerated delivery well. Sponge needle and lap counted correctly x2.    Indications: Encounter for induction of labor  Pregnancy complicated by:   Patient Active Problem List   Diagnosis    AMA (advanced maternal age) multigravida 35+    BMI 38.0-38.9,adult    ADHD    Anxiety and depression    Abnormal O'Turner glucose challenge test, antepartum    Anemia affecting pregnancy in third trimester    Refuses tetanus, diphtheria, and acellular pertussis (Tdap) vaccination    Fetal growth restriction antepartum    Encounter for induction of labor     Admitting GA: 37w0d    Delivery Information for Aime Aguirre    Birth information:  YOB: 2023   Time of birth: 7:21 PM   Sex: male   Head Delivery Date/Time: 2023  7:21 PM   Delivery type: Vaginal, Spontaneous   Gestational Age: 37w0d        Delivery Providers    Delivering clinician: Lexi Espinoza CNM   Provider Role    Avelina Pedraza RN Registered Nurse    Pina Hearn RN Registered Nurse    Ofelia Mejia RN Registered Nurse    Chelsi Kathleen Surgical Tech              Measurements    Weight: 2130 g  Weight (lbs): 4 lb 11.1 oz  Length:          Apgars    Living status: Living  Apgar Component Scores:  1 min.:  5 min.:  10 min.:  15 min.:  20 min.:    Skin color:         Heart rate:         Reflex irritability:         Muscle tone:         Respiratory effort:         Total:         Apgars assigned by: WINSOME HEARN RN         Operative Delivery    Forceps attempted?: No  Vacuum extractor attempted?: No          Shoulder Dystocia    Shoulder dystocia present?: No           Presentation    Presentation: Vertex  Position: Left Occiput Anterior           Interventions/Resuscitation    Method: Bulb Suctioning, Tactile Stimulation       Cord    Vessels: 3 vessels  Complications: None  Delayed Cord Clamping?: Yes  Cord Clamped Date/Time: 2023  7:25 PM  Cord Blood Disposition: Discarded  Gases Sent?: No  Stem Cell Collection (by MD): No       Placenta    Placenta delivery date/time: 2023  Placenta removal: Spontaneous  Placenta appearance: Intact  Placenta disposition: Discarded, Pathology           Labor Events:       labor: No     Labor Onset Date/Time:         Dilation Complete Date/Time:         Start Pushing Date/Time:         Start Pushing Date/Time:       Rupture Date/Time: 23  143         Rupture type: SRM (Spontaneous Rupture)         Fluid Amount:       Fluid Color: Clear, Bloody               steroids: None     Antibiotics given for GBS:       Induction: balloon dilation (Min);oxytocin     Indications for induction:  Intrauterine Growth Restriction     Augmentation:       Indications for augmentation:       Labor complications: Fetal Intolerance     Additional complications:          Cervical ripening:                     Delivery:      Episiotomy: None     Indication for Episiotomy:       Perineal Lacerations: None Repaired:      Periurethral Laceration:   Repaired:     Labial Laceration:   Repaired:     Sulcus Laceration:   Repaired:     Vaginal Laceration:   Repaired:     Cervical Laceration:   Repaired:     Repair suture: None     Repair # of packets:       Last Value - EBL - Nursing (mL):       Sum - EBL - Nursing (mL): 0     Last Value - EBL - Anesthesia (mL):      Calculated QBL (mL): 10      Vaginal Sweep Performed: Yes     Surgicount Correct: Yes     Vaginal Packing: No Quantity:       Other providers:       Anesthesia    Method: Epidural          Details  (if applicable):  Trial of Labor      Categorization:      Priority:     Indications for :     Incision Type:       Additional  information:  Forceps:    Vacuum:    Breech:    Observed anomalies    Other (Comments):

## 2023-11-27 PROBLEM — O36.5990 FETAL GROWTH RESTRICTION ANTEPARTUM: Status: RESOLVED | Noted: 2023-10-30 | Resolved: 2023-11-27

## 2023-11-27 PROBLEM — O99.810 ABNORMAL O'SULLIVAN GLUCOSE CHALLENGE TEST, ANTEPARTUM: Status: RESOLVED | Noted: 2023-09-28 | Resolved: 2023-11-27

## 2023-11-27 PROCEDURE — 25000003 PHARM REV CODE 250

## 2023-11-27 PROCEDURE — 99238 HOSP IP/OBS DSCHRG MGMT 30/<: CPT | Mod: ,,, | Performed by: MIDWIFE

## 2023-11-27 PROCEDURE — 99238 PR HOSPITAL DISCHARGE DAY,<30 MIN: ICD-10-PCS | Mod: ,,, | Performed by: MIDWIFE

## 2023-11-27 RX ADMIN — IBUPROFEN 600 MG: 600 TABLET ORAL at 05:11

## 2023-11-27 RX ADMIN — IBUPROFEN 600 MG: 600 TABLET ORAL at 11:11

## 2023-11-27 RX ADMIN — IBUPROFEN 600 MG: 600 TABLET ORAL at 12:11

## 2023-11-27 RX ADMIN — PRENATAL VITAMINS-IRON FUMARATE 27 MG IRON-FOLIC ACID 0.8 MG TABLET 1 TABLET: at 10:11

## 2023-11-27 NOTE — SUBJECTIVE & OBJECTIVE
"Interval History: PPD2    She is doing well this morning. She is tolerating a regular diet without nausea or vomiting. She is voiding spontaneously. She is ambulating. She has passed flatus, and has not a BM. Vaginal bleeding is moderate. She denies fever or chills. Abdominal pain is mild and controlled with oral medications. She Is not breastfeeding. She desires circumcision for her male baby: not applicable.    Objective:     Vital Signs (Most Recent):  Temp: 98 °F (36.7 °C) (11/27/23 1204)  Pulse: 74 (11/27/23 1204)  Resp: 16 (11/27/23 1204)  BP: (!) 102/52 (11/27/23 1204)  SpO2: 97 % (11/27/23 1204) Vital Signs (24h Range):  Temp:  [97.7 °F (36.5 °C)-98.9 °F (37.2 °C)] 98 °F (36.7 °C)  Pulse:  [69-86] 74  Resp:  [16-20] 16  SpO2:  [97 %-100 %] 97 %  BP: (102-141)/(52-76) 102/52        There is no height or weight on file to calculate BMI.    No intake or output data in the 24 hours ending 11/27/23 1422      Significant Labs:  Lab Results   Component Value Date    GROUPTRH B POS 11/25/2023    HEPBSAG Non-reactive 07/26/2023    STREPBCULT (A) 11/21/2023     STREPTOCOCCUS AGALACTIAE (GROUP B)  In case of Penicillin allergy, call lab for further testing.  Beta-hemolytic streptococci are routinely susceptible to   penicillins,cephalosporins and carbapenems.       No results for input(s): "HGB", "HCT" in the last 48 hours.    I have personallly reviewed all pertinent lab results from the last 24 hours.  Recent Lab Results       None            Physical Exam:   Constitutional: She is oriented to person, place, and time. She appears well-developed and well-nourished.       Cardiovascular:  Normal rate.             Pulmonary/Chest: Effort normal.        Abdominal: Soft.             Musculoskeletal: Normal range of motion and moves all extremeties.       Neurological: She is alert and oriented to person, place, and time.    Skin: Skin is warm and dry.    Psychiatric: She has a normal mood and affect. Her behavior is " normal. Judgment and thought content normal.       Review of Systems   Constitutional:  Negative for fever.   Eyes:  Negative for visual disturbance.   Respiratory:  Negative for cough and shortness of breath.    Cardiovascular:  Negative for leg swelling.   Gastrointestinal:  Positive for abdominal pain.   Genitourinary:  Positive for vaginal bleeding. Negative for vaginal discharge and vaginal pain.   Musculoskeletal:  Negative for joint swelling.   Neurological:  Negative for headaches.

## 2023-11-27 NOTE — PLAN OF CARE
Problem: Adult Inpatient Plan of Care  Goal: Plan of Care Review  11/26/2023 1845 by Lucia Hill RN  Outcome: Ongoing, Progressing  11/26/2023 1845 by Lucia Hill RN  Outcome: Ongoing, Progressing  11/26/2023 1845 by Lucia Hill RN  Outcome: Ongoing, Progressing  Goal: Patient-Specific Goal (Individualized)  11/26/2023 1845 by Lucia Hill RN  Outcome: Ongoing, Progressing  11/26/2023 1845 by Lucia Hill RN  Outcome: Ongoing, Progressing  11/26/2023 1845 by Lucia Hill RN  Outcome: Ongoing, Progressing  Goal: Absence of Hospital-Acquired Illness or Injury  11/26/2023 1845 by Lucia Hill RN  Outcome: Ongoing, Progressing  11/26/2023 1845 by Lucia Hill RN  Outcome: Ongoing, Progressing  11/26/2023 1845 by Lucia Hill RN  Outcome: Ongoing, Progressing  Goal: Optimal Comfort and Wellbeing  11/26/2023 1845 by Lucia Hill RN  Outcome: Ongoing, Progressing  11/26/2023 1845 by Lucia Hill RN  Outcome: Ongoing, Progressing  11/26/2023 1845 by Lucia Hill RN  Outcome: Ongoing, Progressing  Goal: Readiness for Transition of Care  11/26/2023 1845 by Lucia Hill RN  Outcome: Ongoing, Progressing  11/26/2023 1845 by Lucia Hill RN  Outcome: Ongoing, Progressing  11/26/2023 1845 by Lucia Hill RN  Outcome: Ongoing, Progressing     Problem: Bariatric Environmental Safety  Goal: Safety Maintained with Care  11/26/2023 1845 by Lucia Hill RN  Outcome: Ongoing, Progressing  11/26/2023 1845 by Lucia Hill RN  Outcome: Ongoing, Progressing  11/26/2023 1845 by Lucia Hill RN  Outcome: Ongoing, Progressing     Problem: Infection  Goal: Absence of Infection Signs and Symptoms  11/26/2023 1845 by Lucia Hill RN  Outcome: Ongoing, Progressing  11/26/2023 1845 by Ortego, Lucia, RN  Outcome: Ongoing, Progressing  11/26/2023 1845 by Lucia Hill RN  Outcome: Ongoing, Progressing     Problem: Adjustment to Role Transition (Postpartum Vaginal  Delivery)  Goal: Successful Maternal Role Transition  Outcome: Ongoing, Progressing     Problem: Bleeding (Postpartum Vaginal Delivery)  Goal: Hemostasis  Outcome: Ongoing, Progressing     Problem: Infection (Postpartum Vaginal Delivery)  Goal: Absence of Infection Signs/Symptoms  Outcome: Ongoing, Progressing     Problem: Pain (Postpartum Vaginal Delivery)  Goal: Acceptable Pain Control  Outcome: Ongoing, Progressing

## 2023-11-27 NOTE — PLAN OF CARE
[facility] - [facility] (Hospital)  OB Initial Discharge Assessment         Swer completed discharge planning assessment with pt and FOB at bedside. Pt was easily engaged. Education on the role of  was provided. Emotional support provided throughout assessment.     Pt has three other children ages 17, 15, and 14. FOB involved, will be signing birth certificate. Pt currently has a full time job. Baby has all essential items clothes, diapers, car seat, crib, etc. Swer inquired about prenatal care. Pt reported receiving care. Pt has choose adoption.  is Marco Dumont. Per parents adoption will be completed after discharge from hospital. Mother signed consent form for adoptions parents to receive information on baby. Original form placed into mother's chart, copy in baby chart as well as copy provided to bio mother per her request. Adoptive parents name is Lori borrego Sony Campos from [place].       SWER WILL REMAIN AVAILABLE THROUGHOUT PT'S ENTIRE STAY.     Baby's Name; Prince Slava Lord (BIO MOTHER'S NAME FOR BABY)  FOB's Name; Sony Lord   [facility]; N/A  Pediatrician; Undecided    ADOPTION PARENTS WILL CHANGE BABY NAME TO ROBERT PEMBERTON ANTHONY LEVIENSHIP         Primary Care Provider: Mary Frias NP    Expected Discharge Date: 11/25/2023    Initial Assessment (most recent)       OB Discharge Planning Assessment - 11/27/23 0951          OB Discharge Planning Assessment    Assessment Type Discharge Planning Assessment     Source of Information health record     Verified Demographic and Insurance Information Yes     Insurance Medicaid     Medicaid Healthy Blue     Pastoral Care/Clergy/ Contact Status none needed     People in Home child(iza), dependent;significant other     Number people in home 5     Relationship Status In relationship     Name of Support/Comfort Primary Source Sony Lord () [phone]     Other children (include names and ages) 3; F17, M15, and  M14     Employed Full Time     Employer [employer]     Job Title Advisor     Currently Enrolled in School No     Highest Level of 's Degree     Father's Involvement Fully Involved     Is Father signing the birth certificate Yes     Father's Employer Self-Employed     Father's Employer Phone Number [phone]     Primary Contact Name and Number Sony Lord (GREGORIO) [phone]     Received Prenatal Care Yes     Transportation Anticipated family or friend will provide     Receive WIC Benefits Already certified, will apply for new born     Adoption Planned yes     Mother Plans for Involvement in Infant Care Following Delivery? yes     Adoptive Parents Names (if known) Reyes Campos     Adoption Type open      Involved yes     Infant Feeding Plan formula feeding     Does baby have crib or safe sleep space? Yes     Do you have a car seat? Yes     Pediatrician undecided     Resource/Environmental Concerns none     Equipment Currently Used at Home none     DME Needed Upon Discharge  none     DCFS No indications (Indicators for Report)     Do you have any problems affording any of your prescribed medications? No                   Psychosocial (most recent)       OB Psychosocial Assessment - 11/27/23 3145          OB Psychosocial Assessment    Current or Previous  Service none     Anxieties, Fears or Concerns denied     Major Change/Loss/Stressor/Fears denies     Feels Unsafe at Home or Work/School no     Feels Threatened by Someone no     Does anyone try to keep you from having contact with others or doing things outside your home? no     Physical Signs of Abuse Present no     Have You Felt Down, Depressed or Hopeless? no     Have You Felt Little Interest or Pleasure in Doing Things? no     Feels Like Hurting Self None     Feels Like Hurting Others no     Have you ever experienced a traumatic event? no     Have you ever witnessed a violent act? no     Have you ever  experienced a life threatening injury or near death encounter? no     Current/Active Substance Abuse No     Current/Active Behavioral Health Issues No                          Healthcare Directives:   Advance Directive  (If Adv Dir status is received, view document under Adv Dir in header or Chart Review Media tab): (P) Patient does not have Advance Directive, declines information.

## 2023-11-27 NOTE — DISCHARGE INSTRUCTIONS
"Mother Self Care:    Activity: Avoid strenuous exercise and get adequate rest. Do not  anything heavier than the baby or 10lbs. No driving until the physician consent given.  Emotional Changes: Most women find birth to be a time of great emotional upheaval.  Sense of loss, mood swings, fatigue, anxiety, and feeling "let down" are common.  If feelings worsen or last more than a week, call your physician. Refer to handout in blue folder.  Breast Care/Breastfeeding: Wear a bra for comfort.  Keep nipples dry and apply your own breast milk or lanolin cream as needed for soreness.  Engorgement can be relieved with warm, moist heat before feedings.  You may also take Ibuprofen.  Breast Care/Bottle Feeding: Wear support bra 24 hours a day for one week.  Avoid stimulation to breasts.  You may use ice packs for discomfort. Wear a tight fitting bra and then wrap breast with ace bandage. Purchase a head of lettuce or cabbage and place in the fridge. Apply 1 cabbage leaf per breast inside bra to dry up milk.  Juno-Care/Vaginal Bleeding: Remember to use your juno-bottle after urinating.  Your flow will change from red, to pink, to yellow/white color over a period of 2 weeks.  Menstruation will return in 3-8 weeks, or longer if breastfeeding.   Episiotomy Vaginal Delivery: Warm baths, tucks(place in refrigerator), and dermoplast will promote healing.  Avoid bubble baths or strong soaps. You may use epsom salt just nothing highly scented.  Sexual Activity/Pelvic Rest: No sexual activity, tampons, or douching until your physician gives you consent. Nothing in the vagina for 6 weeks  Diet: Continue to eat from the five basic food groups, including plenty of protein, fruits, vegetables, and whole grains.  Limit empty calories and high fat foods.  Drink enough fluids to satisfy thirst and add an extra 500 calories for breastfeeding.  Constipation/Hemorrhoids: Drink plenty of water.  You may take a stool softener or natural " laxative (Metamucil) twice a day. You may use tucks or hemorrhoid ointment and soak in a warm tub.    CALL YOUR OB DOCTOR IF ANY OF THE FOLLOWING OCCURS:  *Heavy bleeding - saturating a pad an hour or passing any large (2-3 inches in size) blood clots the size of a plum or larger.  *Any pain, redness, or tenderness in lower leg.  *You cannot care for yourself or your baby.  *Any signs of infection-      - Temperature greater than 100.5 degrees F      - Foul smelling vaginal discharge and/or incisional drainage      - Increased episiotomy or incisional pain      - Hot, hard, red or sore area on breast      - Flu-like symptoms      - Any urgency, frequency or burning with urination    Return To the Hospital for further Evaluation:  Headache not relieved by tylenol or ibuprofen  Blurry vision, double vision, seeing spots, or flashing lights  Feeling faint or passing out  Right epigastric pain  Difficulty breathing  Swelling in hands, face, or feet  Any of these symptoms accompanied by nausea/vomiting  Gaining more than 5 pounds in one week  Seizures  These symptoms could be an indication of elevated blood pressure.     For patients that were treated for high blood pressure during pregnancy and or your hospital stay you will need a blood pressure check three days after you leave the hospital. Your nursing staff will assist you in an appointment if needed. If you have Connected Mom you may use your blood pressure cuff and report any readings 140/90 to your provider immediately.       If you have any questions that need to be answered immediately please call the Labor & Delivery Unit at [phone] and ask to speak to a nurse.      Please see [facility] BLUE folder for additional information and handouts.

## 2023-11-27 NOTE — DISCHARGE SUMMARY
[facility] (Hospital)  Obstetrics  Discharge Summary      Patient Name: Brianna Aguirre  MRN: [id number]  Admission Date: 2023  Hospital Length of Stay: 2 days  Discharge Date and Time:  23  Attending Physician: Viktor Garcia MD   Discharging Provider: Oracio Chan CNM   Primary Care Provider: Mary Frias NP    HPI: Brianna Aguirre is a 38 y.o. female  37w0d from home for IOL for IUGR    * No surgery found *     Hospital Course:    37w0d IOL IUGR  Admit  Pitocin titrate for adequate labor pattern  CM  ASVD    23 @ 0715:  Will eat breakfast   Pitocin 2x2 and will place gaston balloon   Epidural per patient request  Anticipate      23 PPD #1 desires discharge when baby gets discharged.  Continue with routine PP care    23- PPD2 patient to be discharged home with infant today, continue with routine pp care.      Consults (From admission, onward)          Status Ordering Provider     Inpatient consult to Social Work  Once        Provider:  (Not yet assigned)    Completed MANDI LEVI            Final Active Diagnoses:    Diagnosis Date Noted POA    PRINCIPAL PROBLEM:   (spontaneous vaginal delivery) [O80] 2023 Not Applicable    Single liveborn [Z38.2] 2023 No    Anemia affecting pregnancy in third trimester [O99.013] 2023 Yes    AMA (advanced maternal age) multigravida 35+ [O09.529] 2023 Yes      Problems Resolved During this Admission:    Diagnosis Date Noted Date Resolved POA    Encounter for induction of labor [Z34.90] 2023 Not Applicable    Fetal growth restriction antepartum [O36.5990] 10/30/2023 2023 Yes    Abnormal O'Turner glucose challenge test, antepartum [O99.810] 2023 Yes        Significant Diagnostic Studies: Labs: All labs within the past 24 hours have been reviewed      Feeding Method: bottle feeding     Immunizations       Date Immunization Status Dose  "Route/Site Given by    23 MMR Incomplete 0.5 mL Subcutaneous/     23 Tdap Incomplete 0.5 mL Intramuscular/             Delivery:    Episiotomy: None   Lacerations: None   Repair suture: None   Repair # of packets:     Blood loss (ml):       Birth information:  YOB: 2023   Time of birth: 7:21 PM   Sex: male   Delivery type: Vaginal, Spontaneous   Gestational Age: 37w0d     Measurements    Weight: 2130 g  Weight (lbs): 4 lb 11.1 oz  Length: 47 cm  Length (in): 18.5"  Head circumference: 30.5 cm  Chest circumference: 30.5 cm  Abdominal girth: 29.2 cm         Delivery Clinician: Delivery Providers    Delivering clinician: Lexi Espinoza CNM   Provider Role    Avelina Pedraza, RN Registered Nurse    Pina Hearn RN Registered Nurse    Ofelia Mejia RN Registered Nurse    Chelsi Kathleen Surgical Tech             Additional  information:  Forceps:    Vacuum:    Breech:    Observed anomalies      Living?:     Apgars    Living status: Living  Apgar Component Scores:  1 min.:  5 min.:  10 min.:  15 min.:  20 min.:    Skin color:  1  1       Heart rate:  2  2       Reflex irritability:  2  2       Muscle tone:  2  2       Respiratory effort:  2  2       Total:  9  9       Apgars assigned by: WINSOME HEARN RN         Placenta: Delivered:       appearance  Pending Diagnostic Studies:       Procedure Component Value Units Date/Time    Specimen to Pathology, Surgery Gynecology and Obstetrics [[id number]] Collected: 23    Order Status: Sent Lab Status: In process Updated: 2359    Specimen: Tissue             Discharged Condition: stable    Disposition: Home or Self Care    Follow Up:   Follow-up Information       Nu Story CNM Follow up in 4 week(s).    Specialty: Obstetrics and Gynecology  Contact information:  [address]  [place] [address] [phone]                           Patient Instructions:      Diet Adult Regular     No driving until: "   Order Comments: 1-2 weeks     Pelvic Rest   Order Comments: Until cleared at postpartum appointment by provider     Medications:  Current Discharge Medication List        CONTINUE these medications which have NOT CHANGED    Details   buPROPion (WELLBUTRIN XL) 150 MG TB24 tablet Take 150 mg by mouth every morning.      ferrous sulfate (FEOSOL) 325 mg (65 mg iron) Tab tablet Take 1 tablet (325 mg total) by mouth 2 (two) times daily.  Qty: 60 tablet, Refills: 3    Associated Diagnoses: Anemia affecting pregnancy in third trimester      FLUoxetine 40 MG capsule Take 40 mg by mouth every morning.           STOP taking these medications       aspirin 81 MG Chew Comments:   Reason for Stopping:               Oracio Chan CNM  Obstetrics  [facility] ([facility])    BRANT [place]

## 2023-11-29 LAB
FINAL PATHOLOGIC DIAGNOSIS: NORMAL
GROSS: NORMAL
Lab: NORMAL

## 2024-01-03 ENCOUNTER — POSTPARTUM VISIT (OUTPATIENT)
Dept: OBSTETRICS AND GYNECOLOGY | Facility: CLINIC | Age: 39
End: 2024-01-03
Payer: MEDICAID

## 2024-01-03 VITALS
BODY MASS INDEX: 40.45 KG/M2 | DIASTOLIC BLOOD PRESSURE: 82 MMHG | TEMPERATURE: 98 F | HEART RATE: 72 BPM | SYSTOLIC BLOOD PRESSURE: 130 MMHG | OXYGEN SATURATION: 96 % | SYSTOLIC BLOOD PRESSURE: 137 MMHG | DIASTOLIC BLOOD PRESSURE: 76 MMHG | WEIGHT: 235.69 LBS | RESPIRATION RATE: 18 BRPM

## 2024-01-03 PROBLEM — O09.529 AMA (ADVANCED MATERNAL AGE) MULTIGRAVIDA 35+: Status: RESOLVED | Noted: 2023-08-07 | Resolved: 2024-01-03

## 2024-01-03 PROBLEM — O99.013 ANEMIA AFFECTING PREGNANCY IN THIRD TRIMESTER: Status: RESOLVED | Noted: 2023-09-28 | Resolved: 2024-01-03

## 2024-01-03 PROCEDURE — 99212 OFFICE O/P EST SF 10 MIN: CPT | Mod: PBBFAC,TH | Performed by: MIDWIFE

## 2024-01-03 PROCEDURE — 99999 PR PBB SHADOW E&M-EST. PATIENT-LVL II: CPT | Mod: PBBFAC,,, | Performed by: MIDWIFE

## 2024-01-03 NOTE — PROGRESS NOTES
Subjective:       Brianna Aguirre is a 38 y.o. female who presents for a postpartum visit. She is 6 weeks postpartum following a spontaneous vaginal delivery. I have fully reviewed the prenatal and intrapartum course. The delivery was at 37w0d gestational weeks. Outcome: spontaneous vaginal delivery. Anesthesia: epidural. Postpartum course has been unremarkable. Baby's course has been-adoption. Bleeding moderate lochia. Patient states her cycle has returned and is heavier and longer than normal. Instructed patient to follow up if cycle continues to be heavy for the next few days of if she develops large clots, fever, uterine pain, or any other s/s or concerns. Bowel function is normal. Bladder function is normal. Patient is not sexually active. Contraception method is none, patient declines. Postpartum depression screening: negative.      Review of Systems  A comprehensive review of systems was negative.     Objective:      /82   Wt 106.9 kg (235 lb 10.8 oz)   LMP 12/29/2023 (Exact Date)   Breastfeeding Unknown   BMI 40.45 kg/m²    General:  alert, appears stated age, and cooperative    Breasts:  negative   Abdomen: soft, non-tender; bowel sounds normal; no masses,  no organomegaly          Assessment:      Normal postpartum exam. Pap smear not done at today's visit, WNL 7/23.     Plan:      1. Contraception: none  2. Patient to contact us for return if menstrual cycle continues to be heavy/long in next few days.   3. Follow up in: 1  year  or as needed.       BRANT Victoria

## 2024-02-12 ENCOUNTER — PATIENT MESSAGE (OUTPATIENT)
Dept: OBSTETRICS AND GYNECOLOGY | Facility: CLINIC | Age: 39
End: 2024-02-12
Payer: MEDICAID